# Patient Record
Sex: FEMALE | Race: BLACK OR AFRICAN AMERICAN | NOT HISPANIC OR LATINO | Employment: FULL TIME | URBAN - METROPOLITAN AREA
[De-identification: names, ages, dates, MRNs, and addresses within clinical notes are randomized per-mention and may not be internally consistent; named-entity substitution may affect disease eponyms.]

---

## 2017-05-25 ENCOUNTER — HOSPITAL ENCOUNTER (EMERGENCY)
Facility: HOSPITAL | Age: 32
Discharge: HOME | End: 2017-05-25
Attending: EMERGENCY MEDICINE | Admitting: EMERGENCY MEDICINE
Payer: COMMERCIAL

## 2017-05-25 VITALS
TEMPERATURE: 98.6 F | OXYGEN SATURATION: 100 % | DIASTOLIC BLOOD PRESSURE: 57 MMHG | HEIGHT: 65 IN | SYSTOLIC BLOOD PRESSURE: 113 MMHG | HEART RATE: 84 BPM | RESPIRATION RATE: 16 BRPM | WEIGHT: 107.58 LBS | BODY MASS INDEX: 17.92 KG/M2

## 2017-05-25 DIAGNOSIS — Z34.91 FIRST TRIMESTER PREGNANCY: Primary | ICD-10-CM

## 2017-05-25 LAB
BILIRUB UR QL STRIP: NEGATIVE
CLARITY UR: CLEAR
COLOR UR: YELLOW
GLUCOSE UR STRIP-MCNC: NEGATIVE MG/DL
HCG UR QL: POSITIVE
HGB UR QL STRIP.AUTO: NEGATIVE
KETONES UR STRIP-MCNC: NEGATIVE MG/DL
LEUKOCYTE ESTERASE UR QL STRIP: NEGATIVE
NITRITE UR QL STRIP: NEGATIVE
PH UR STRIP.AUTO: 7 [PH] (ref 4.5–8)
PROT UR STRIP-MCNC: NEGATIVE MG/DL
SP GR UR STRIP.AUTO: 1.02 (ref 1–1.03)
UROBILINOGEN UR QL STRIP.AUTO: 1 E.U./DL

## 2017-05-25 PROCEDURE — 81003 URINALYSIS AUTO W/O SCOPE: CPT | Performed by: EMERGENCY MEDICINE

## 2017-05-25 PROCEDURE — 81025 URINE PREGNANCY TEST: CPT | Performed by: EMERGENCY MEDICINE

## 2017-05-25 PROCEDURE — 99284 EMERGENCY DEPT VISIT MOD MDM: CPT

## 2017-05-25 RX ORDER — ACETAMINOPHEN 325 MG/1
650 TABLET ORAL ONCE
Status: COMPLETED | OUTPATIENT
Start: 2017-05-25 | End: 2017-05-25

## 2017-05-25 RX ORDER — ACETAMINOPHEN 325 MG/1
650 TABLET ORAL ONCE
Status: DISCONTINUED | OUTPATIENT
Start: 2017-05-25 | End: 2017-05-25

## 2017-05-25 RX ADMIN — ACETAMINOPHEN 650 MG: 325 TABLET ORAL at 14:16

## 2017-05-31 ENCOUNTER — APPOINTMENT (OUTPATIENT)
Dept: LAB | Facility: MEDICAL CENTER | Age: 32
End: 2017-05-31
Payer: COMMERCIAL

## 2017-05-31 ENCOUNTER — ALLSCRIPTS OFFICE VISIT (OUTPATIENT)
Dept: OTHER | Facility: OTHER | Age: 32
End: 2017-05-31

## 2017-05-31 DIAGNOSIS — Z33.1 PREGNANT STATE, INCIDENTAL: ICD-10-CM

## 2017-05-31 LAB
BACTERIA UR QL AUTO: NORMAL /HPF
BASOPHILS # BLD AUTO: 0.01 THOUSANDS/ΜL (ref 0–0.1)
BASOPHILS NFR BLD AUTO: 0 % (ref 0–1)
BILIRUB UR QL STRIP: NEGATIVE
CLARITY UR: CLEAR
COLOR UR: YELLOW
EOSINOPHIL # BLD AUTO: 0.22 THOUSAND/ΜL (ref 0–0.61)
EOSINOPHIL NFR BLD AUTO: 2 % (ref 0–6)
ERYTHROCYTE [DISTWIDTH] IN BLOOD BY AUTOMATED COUNT: 13.8 % (ref 11.6–15.1)
GLUCOSE UR STRIP-MCNC: NEGATIVE MG/DL
HCT VFR BLD AUTO: 35.3 % (ref 34.8–46.1)
HGB BLD-MCNC: 11.5 G/DL (ref 11.5–15.4)
HGB UR QL STRIP.AUTO: NEGATIVE
HYALINE CASTS #/AREA URNS LPF: NORMAL /LPF
KETONES UR STRIP-MCNC: NEGATIVE MG/DL
LEUKOCYTE ESTERASE UR QL STRIP: NEGATIVE
LYMPHOCYTES # BLD AUTO: 2.11 THOUSANDS/ΜL (ref 0.6–4.47)
LYMPHOCYTES NFR BLD AUTO: 20 % (ref 14–44)
MCH RBC QN AUTO: 27.8 PG (ref 26.8–34.3)
MCHC RBC AUTO-ENTMCNC: 32.6 G/DL (ref 31.4–37.4)
MCV RBC AUTO: 85 FL (ref 82–98)
MONOCYTES # BLD AUTO: 0.58 THOUSAND/ΜL (ref 0.17–1.22)
MONOCYTES NFR BLD AUTO: 6 % (ref 4–12)
NEUTROPHILS # BLD AUTO: 7.38 THOUSANDS/ΜL (ref 1.85–7.62)
NEUTS SEG NFR BLD AUTO: 72 % (ref 43–75)
NITRITE UR QL STRIP: NEGATIVE
NON-SQ EPI CELLS URNS QL MICRO: NORMAL /HPF
NRBC BLD AUTO-RTO: 0 /100 WBCS
PH UR STRIP.AUTO: 6.5 [PH] (ref 4.5–8)
PLATELET # BLD AUTO: 287 THOUSANDS/UL (ref 149–390)
PMV BLD AUTO: 10.3 FL (ref 8.9–12.7)
PROT UR STRIP-MCNC: NEGATIVE MG/DL
RBC # BLD AUTO: 4.14 MILLION/UL (ref 3.81–5.12)
RBC #/AREA URNS AUTO: NORMAL /HPF
RUBV IGG SERPL IA-ACNC: 8.3 IU/ML
SP GR UR STRIP.AUTO: 1.02 (ref 1–1.03)
UROBILINOGEN UR QL STRIP.AUTO: 0.2 E.U./DL
WBC # BLD AUTO: 10.32 THOUSAND/UL (ref 4.31–10.16)
WBC #/AREA URNS AUTO: NORMAL /HPF

## 2017-05-31 PROCEDURE — 81001 URINALYSIS AUTO W/SCOPE: CPT

## 2017-05-31 PROCEDURE — 87086 URINE CULTURE/COLONY COUNT: CPT

## 2017-05-31 PROCEDURE — 80081 OBSTETRIC PANEL INC HIV TSTG: CPT

## 2017-05-31 PROCEDURE — 36415 COLL VENOUS BLD VENIPUNCTURE: CPT

## 2017-06-01 ENCOUNTER — LAB REQUISITION (OUTPATIENT)
Dept: LAB | Facility: HOSPITAL | Age: 32
End: 2017-06-01
Payer: COMMERCIAL

## 2017-06-01 DIAGNOSIS — Z33.1 PREGNANT STATE, INCIDENTAL: ICD-10-CM

## 2017-06-01 LAB
ABO GROUP BLD: NORMAL
BACTERIA UR CULT: NORMAL
BLD GP AB SCN SERPL QL: NEGATIVE
HBV SURFACE AG SER QL: NORMAL
RH BLD: POSITIVE
RPR SER QL: NORMAL
SPECIMEN EXPIRATION DATE: NORMAL

## 2017-06-02 ENCOUNTER — GENERIC CONVERSION - ENCOUNTER (OUTPATIENT)
Dept: OTHER | Facility: OTHER | Age: 32
End: 2017-06-02

## 2017-06-02 LAB — HIV 1+2 AB+HIV1 P24 AG SERPL QL IA: NORMAL

## 2017-06-14 ENCOUNTER — ALLSCRIPTS OFFICE VISIT (OUTPATIENT)
Dept: OTHER | Facility: OTHER | Age: 32
End: 2017-06-14

## 2017-07-03 ENCOUNTER — APPOINTMENT (EMERGENCY)
Dept: ULTRASOUND IMAGING | Facility: HOSPITAL | Age: 32
End: 2017-07-03
Payer: COMMERCIAL

## 2017-07-03 ENCOUNTER — HOSPITAL ENCOUNTER (EMERGENCY)
Facility: HOSPITAL | Age: 32
Discharge: HOME | End: 2017-07-03
Attending: EMERGENCY MEDICINE | Admitting: EMERGENCY MEDICINE
Payer: COMMERCIAL

## 2017-07-03 ENCOUNTER — GENERIC CONVERSION - ENCOUNTER (OUTPATIENT)
Dept: OTHER | Facility: OTHER | Age: 32
End: 2017-07-03

## 2017-07-03 VITALS
OXYGEN SATURATION: 100 % | DIASTOLIC BLOOD PRESSURE: 54 MMHG | BODY MASS INDEX: 18.3 KG/M2 | TEMPERATURE: 98.6 F | SYSTOLIC BLOOD PRESSURE: 116 MMHG | RESPIRATION RATE: 18 BRPM | HEART RATE: 75 BPM | WEIGHT: 113.9 LBS | HEIGHT: 66 IN

## 2017-07-03 DIAGNOSIS — N39.0 URINARY TRACT INFECTION: ICD-10-CM

## 2017-07-03 DIAGNOSIS — O20.0 THREATENED MISCARRIAGE: Primary | ICD-10-CM

## 2017-07-03 LAB
ABO GROUP BLD: NORMAL
B-HCG SERPL-ACNC: ABNORMAL MIU/ML
BACTERIA UR QL AUTO: ABNORMAL /HPF
BASOPHILS # BLD AUTO: 0.01 THOUSANDS/ΜL (ref 0–0.1)
BASOPHILS NFR BLD AUTO: 0 % (ref 0–1)
BILIRUB UR QL STRIP: ABNORMAL
BLD GP AB SCN SERPL QL: NEGATIVE
CLARITY UR: ABNORMAL
COLOR UR: ABNORMAL
EOSINOPHIL # BLD AUTO: 0 THOUSAND/ΜL (ref 0–0.61)
EOSINOPHIL NFR BLD AUTO: 0 % (ref 0–6)
ERYTHROCYTE [DISTWIDTH] IN BLOOD BY AUTOMATED COUNT: 13.7 % (ref 11.6–15.1)
GLUCOSE UR STRIP-MCNC: NEGATIVE MG/DL
HCG UR QL: ABNORMAL
HCT VFR BLD AUTO: 34 % (ref 34.8–46.1)
HGB BLD-MCNC: 11.1 G/DL (ref 11.5–15.4)
HGB UR QL STRIP.AUTO: ABNORMAL
KETONES UR STRIP-MCNC: NEGATIVE MG/DL
LEUKOCYTE ESTERASE UR QL STRIP: NEGATIVE
LYMPHOCYTES # BLD AUTO: 1.83 THOUSANDS/ΜL (ref 0.6–4.47)
LYMPHOCYTES NFR BLD AUTO: 17 % (ref 14–44)
MCH RBC QN AUTO: 27.5 PG (ref 26.8–34.3)
MCHC RBC AUTO-ENTMCNC: 32.6 G/DL (ref 31.4–37.4)
MCV RBC AUTO: 84 FL (ref 82–98)
MONOCYTES # BLD AUTO: 0.56 THOUSAND/ΜL (ref 0.17–1.22)
MONOCYTES NFR BLD AUTO: 5 % (ref 4–12)
NEUTROPHILS # BLD AUTO: 8.16 THOUSANDS/ΜL (ref 1.85–7.62)
NEUTS SEG NFR BLD AUTO: 77 % (ref 43–75)
NITRITE UR QL STRIP: NEGATIVE
NON-SQ EPI CELLS URNS QL MICRO: ABNORMAL /HPF
NRBC BLD AUTO-RTO: 0 /100 WBCS
PH UR STRIP.AUTO: 5.5 [PH] (ref 4.5–8)
PLATELET # BLD AUTO: 196 THOUSANDS/UL (ref 149–390)
PMV BLD AUTO: 10.1 FL (ref 8.9–12.7)
PROT UR STRIP-MCNC: ABNORMAL MG/DL
RBC # BLD AUTO: 4.03 MILLION/UL (ref 3.81–5.12)
RBC #/AREA URNS AUTO: ABNORMAL /HPF
RH BLD: POSITIVE
SP GR UR STRIP.AUTO: 1.02 (ref 1–1.03)
SPECIMEN EXPIRATION DATE: NORMAL
UROBILINOGEN UR QL STRIP.AUTO: 0.2 E.U./DL
WBC # BLD AUTO: 10.6 THOUSAND/UL (ref 4.31–10.16)
WBC #/AREA URNS AUTO: ABNORMAL /HPF

## 2017-07-03 PROCEDURE — 81025 URINE PREGNANCY TEST: CPT

## 2017-07-03 PROCEDURE — 85025 COMPLETE CBC W/AUTO DIFF WBC: CPT | Performed by: EMERGENCY MEDICINE

## 2017-07-03 PROCEDURE — 86850 RBC ANTIBODY SCREEN: CPT | Performed by: EMERGENCY MEDICINE

## 2017-07-03 PROCEDURE — 86900 BLOOD TYPING SEROLOGIC ABO: CPT | Performed by: EMERGENCY MEDICINE

## 2017-07-03 PROCEDURE — 36415 COLL VENOUS BLD VENIPUNCTURE: CPT | Performed by: EMERGENCY MEDICINE

## 2017-07-03 PROCEDURE — 99284 EMERGENCY DEPT VISIT MOD MDM: CPT

## 2017-07-03 PROCEDURE — 84702 CHORIONIC GONADOTROPIN TEST: CPT | Performed by: EMERGENCY MEDICINE

## 2017-07-03 PROCEDURE — 81001 URINALYSIS AUTO W/SCOPE: CPT | Performed by: EMERGENCY MEDICINE

## 2017-07-03 PROCEDURE — 76801 OB US < 14 WKS SINGLE FETUS: CPT

## 2017-07-03 PROCEDURE — 86901 BLOOD TYPING SEROLOGIC RH(D): CPT | Performed by: EMERGENCY MEDICINE

## 2017-07-03 RX ORDER — NITROFURANTOIN 25; 75 MG/1; MG/1
100 CAPSULE ORAL 2 TIMES DAILY
Qty: 14 CAPSULE | Refills: 0 | Status: SHIPPED | OUTPATIENT
Start: 2017-07-03 | End: 2017-07-10

## 2017-07-05 ENCOUNTER — ALLSCRIPTS OFFICE VISIT (OUTPATIENT)
Dept: OTHER | Facility: OTHER | Age: 32
End: 2017-07-05

## 2017-07-05 ENCOUNTER — APPOINTMENT (OUTPATIENT)
Dept: LAB | Facility: HOSPITAL | Age: 32
End: 2017-07-05
Attending: OBSTETRICS & GYNECOLOGY
Payer: COMMERCIAL

## 2017-07-05 DIAGNOSIS — N63.0 BREAST LUMP: ICD-10-CM

## 2017-07-05 DIAGNOSIS — Z34.00 ENCOUNTER FOR SUPERVISION OF NORMAL FIRST PREGNANCY: ICD-10-CM

## 2017-07-05 PROCEDURE — 87660 TRICHOMONAS VAGIN DIR PROBE: CPT

## 2017-07-05 PROCEDURE — G0145 SCR C/V CYTO,THINLAYER,RESCR: HCPCS | Performed by: OBSTETRICS & GYNECOLOGY

## 2017-07-05 PROCEDURE — 87510 GARDNER VAG DNA DIR PROBE: CPT

## 2017-07-05 PROCEDURE — 87624 HPV HI-RISK TYP POOLED RSLT: CPT | Performed by: OBSTETRICS & GYNECOLOGY

## 2017-07-05 PROCEDURE — 87491 CHLMYD TRACH DNA AMP PROBE: CPT | Performed by: OBSTETRICS & GYNECOLOGY

## 2017-07-05 PROCEDURE — 87591 N.GONORRHOEAE DNA AMP PROB: CPT | Performed by: OBSTETRICS & GYNECOLOGY

## 2017-07-05 PROCEDURE — 87480 CANDIDA DNA DIR PROBE: CPT

## 2017-07-06 ENCOUNTER — LAB REQUISITION (OUTPATIENT)
Dept: LAB | Facility: HOSPITAL | Age: 32
End: 2017-07-06
Payer: COMMERCIAL

## 2017-07-06 DIAGNOSIS — Z34.00 ENCOUNTER FOR SUPERVISION OF NORMAL FIRST PREGNANCY: ICD-10-CM

## 2017-07-07 LAB
CANDIDA RRNA VAG QL PROBE: NEGATIVE
G VAGINALIS RRNA GENITAL QL PROBE: POSITIVE
T VAGINALIS RRNA GENITAL QL PROBE: NEGATIVE

## 2017-07-10 LAB
CHLAMYDIA DNA CVX QL NAA+PROBE: NORMAL
N GONORRHOEA DNA GENITAL QL NAA+PROBE: NORMAL

## 2017-07-13 LAB — HPV RRNA GENITAL QL NAA+PROBE: NORMAL

## 2017-07-17 LAB
LAB AP GYN PRIMARY INTERPRETATION: NORMAL
LAB AP LMP: NORMAL
Lab: NORMAL
PATH INTERP SPEC-IMP: NORMAL

## 2017-07-19 ENCOUNTER — APPOINTMENT (OUTPATIENT)
Dept: LAB | Facility: MEDICAL CENTER | Age: 32
End: 2017-07-19
Payer: COMMERCIAL

## 2017-07-19 ENCOUNTER — GENERIC CONVERSION - ENCOUNTER (OUTPATIENT)
Dept: OTHER | Facility: OTHER | Age: 32
End: 2017-07-19

## 2017-07-19 ENCOUNTER — TRANSCRIBE ORDERS (OUTPATIENT)
Dept: ADMINISTRATIVE | Facility: HOSPITAL | Age: 32
End: 2017-07-19

## 2017-07-19 DIAGNOSIS — Z33.1 PREGNANT STATE, INCIDENTAL: Primary | ICD-10-CM

## 2017-07-19 LAB
BACTERIA UR QL AUTO: ABNORMAL /HPF
BASOPHILS # BLD AUTO: 0.01 THOUSANDS/ΜL (ref 0–0.1)
BASOPHILS NFR BLD AUTO: 0 % (ref 0–1)
BILIRUB UR QL STRIP: NEGATIVE
CLARITY UR: ABNORMAL
COLOR UR: YELLOW
EOSINOPHIL # BLD AUTO: 0.13 THOUSAND/ΜL (ref 0–0.61)
EOSINOPHIL NFR BLD AUTO: 1 % (ref 0–6)
ERYTHROCYTE [DISTWIDTH] IN BLOOD BY AUTOMATED COUNT: 14.2 % (ref 11.6–15.1)
GLUCOSE UR STRIP-MCNC: NEGATIVE MG/DL
HCT VFR BLD AUTO: 32.5 % (ref 34.8–46.1)
HGB BLD-MCNC: 10.5 G/DL (ref 11.5–15.4)
HGB UR QL STRIP.AUTO: ABNORMAL
KETONES UR STRIP-MCNC: NEGATIVE MG/DL
LEUKOCYTE ESTERASE UR QL STRIP: ABNORMAL
LYMPHOCYTES # BLD AUTO: 1.57 THOUSANDS/ΜL (ref 0.6–4.47)
LYMPHOCYTES NFR BLD AUTO: 17 % (ref 14–44)
MCH RBC QN AUTO: 27.6 PG (ref 26.8–34.3)
MCHC RBC AUTO-ENTMCNC: 32.3 G/DL (ref 31.4–37.4)
MCV RBC AUTO: 85 FL (ref 82–98)
MONOCYTES # BLD AUTO: 0.73 THOUSAND/ΜL (ref 0.17–1.22)
MONOCYTES NFR BLD AUTO: 8 % (ref 4–12)
NEUTROPHILS # BLD AUTO: 7.02 THOUSANDS/ΜL (ref 1.85–7.62)
NEUTS SEG NFR BLD AUTO: 74 % (ref 43–75)
NITRITE UR QL STRIP: NEGATIVE
NON-SQ EPI CELLS URNS QL MICRO: ABNORMAL /HPF
NRBC BLD AUTO-RTO: 0 /100 WBCS
PH UR STRIP.AUTO: 6 [PH] (ref 4.5–8)
PLATELET # BLD AUTO: 210 THOUSANDS/UL (ref 149–390)
PMV BLD AUTO: 10.1 FL (ref 8.9–12.7)
PROT UR STRIP-MCNC: NEGATIVE MG/DL
RBC # BLD AUTO: 3.81 MILLION/UL (ref 3.81–5.12)
RBC #/AREA URNS AUTO: ABNORMAL /HPF
SP GR UR STRIP.AUTO: 1.02 (ref 1–1.03)
UROBILINOGEN UR QL STRIP.AUTO: 0.2 E.U./DL
WBC # BLD AUTO: 9.48 THOUSAND/UL (ref 4.31–10.16)
WBC #/AREA URNS AUTO: ABNORMAL /HPF

## 2017-07-19 PROCEDURE — 85025 COMPLETE CBC W/AUTO DIFF WBC: CPT | Performed by: OBSTETRICS & GYNECOLOGY

## 2017-07-19 PROCEDURE — 81001 URINALYSIS AUTO W/SCOPE: CPT | Performed by: OBSTETRICS & GYNECOLOGY

## 2017-07-19 PROCEDURE — 36415 COLL VENOUS BLD VENIPUNCTURE: CPT | Performed by: OBSTETRICS & GYNECOLOGY

## 2017-07-20 ENCOUNTER — GENERIC CONVERSION - ENCOUNTER (OUTPATIENT)
Dept: OTHER | Facility: OTHER | Age: 32
End: 2017-07-20

## 2017-07-20 ENCOUNTER — ALLSCRIPTS OFFICE VISIT (OUTPATIENT)
Dept: PERINATAL CARE | Facility: CLINIC | Age: 32
End: 2017-07-20
Payer: COMMERCIAL

## 2017-07-20 PROCEDURE — 76805 OB US >/= 14 WKS SNGL FETUS: CPT | Performed by: OBSTETRICS & GYNECOLOGY

## 2017-07-24 ENCOUNTER — GENERIC CONVERSION - ENCOUNTER (OUTPATIENT)
Dept: OTHER | Facility: OTHER | Age: 32
End: 2017-07-24

## 2017-07-26 ENCOUNTER — ALLSCRIPTS OFFICE VISIT (OUTPATIENT)
Dept: OTHER | Facility: OTHER | Age: 32
End: 2017-07-26

## 2017-08-23 ENCOUNTER — GENERIC CONVERSION - ENCOUNTER (OUTPATIENT)
Dept: OTHER | Facility: OTHER | Age: 32
End: 2017-08-23

## 2017-08-25 ENCOUNTER — GENERIC CONVERSION - ENCOUNTER (OUTPATIENT)
Dept: OTHER | Facility: OTHER | Age: 32
End: 2017-08-25

## 2017-08-25 ENCOUNTER — ALLSCRIPTS OFFICE VISIT (OUTPATIENT)
Dept: PERINATAL CARE | Facility: MEDICAL CENTER | Age: 32
End: 2017-08-25
Payer: COMMERCIAL

## 2017-08-25 PROCEDURE — 76805 OB US >/= 14 WKS SNGL FETUS: CPT | Performed by: OBSTETRICS & GYNECOLOGY

## 2017-08-25 PROCEDURE — 76817 TRANSVAGINAL US OBSTETRIC: CPT | Performed by: OBSTETRICS & GYNECOLOGY

## 2017-09-11 ENCOUNTER — GENERIC CONVERSION - ENCOUNTER (OUTPATIENT)
Dept: OTHER | Facility: OTHER | Age: 32
End: 2017-09-11

## 2017-09-12 ENCOUNTER — GENERIC CONVERSION - ENCOUNTER (OUTPATIENT)
Dept: OTHER | Facility: OTHER | Age: 32
End: 2017-09-12

## 2017-09-12 DIAGNOSIS — Z34.90 ENCOUNTER FOR SUPERVISION OF NORMAL PREGNANCY: ICD-10-CM

## 2017-10-10 ENCOUNTER — APPOINTMENT (OUTPATIENT)
Dept: LAB | Facility: MEDICAL CENTER | Age: 32
End: 2017-10-10
Payer: COMMERCIAL

## 2017-10-10 DIAGNOSIS — Z34.90 ENCOUNTER FOR SUPERVISION OF NORMAL PREGNANCY: ICD-10-CM

## 2017-10-10 LAB
BASOPHILS # BLD AUTO: 0.01 THOUSANDS/ΜL (ref 0–0.1)
BASOPHILS NFR BLD AUTO: 0 % (ref 0–1)
EOSINOPHIL # BLD AUTO: 0.12 THOUSAND/ΜL (ref 0–0.61)
EOSINOPHIL NFR BLD AUTO: 1 % (ref 0–6)
ERYTHROCYTE [DISTWIDTH] IN BLOOD BY AUTOMATED COUNT: 13.6 % (ref 11.6–15.1)
GLUCOSE 1H P 50 G GLC PO SERPL-MCNC: 129 MG/DL
HCT VFR BLD AUTO: 32.1 % (ref 34.8–46.1)
HGB BLD-MCNC: 10.5 G/DL (ref 11.5–15.4)
LYMPHOCYTES # BLD AUTO: 1.19 THOUSANDS/ΜL (ref 0.6–4.47)
LYMPHOCYTES NFR BLD AUTO: 12 % (ref 14–44)
MCH RBC QN AUTO: 27.7 PG (ref 26.8–34.3)
MCHC RBC AUTO-ENTMCNC: 32.7 G/DL (ref 31.4–37.4)
MCV RBC AUTO: 85 FL (ref 82–98)
MONOCYTES # BLD AUTO: 0.46 THOUSAND/ΜL (ref 0.17–1.22)
MONOCYTES NFR BLD AUTO: 5 % (ref 4–12)
NEUTROPHILS # BLD AUTO: 8.05 THOUSANDS/ΜL (ref 1.85–7.62)
NEUTS SEG NFR BLD AUTO: 82 % (ref 43–75)
NRBC BLD AUTO-RTO: 0 /100 WBCS
PLATELET # BLD AUTO: 200 THOUSANDS/UL (ref 149–390)
PMV BLD AUTO: 10.7 FL (ref 8.9–12.7)
RBC # BLD AUTO: 3.79 MILLION/UL (ref 3.81–5.12)
WBC # BLD AUTO: 9.87 THOUSAND/UL (ref 4.31–10.16)

## 2017-10-10 PROCEDURE — 82950 GLUCOSE TEST: CPT

## 2017-10-10 PROCEDURE — 85025 COMPLETE CBC W/AUTO DIFF WBC: CPT

## 2017-10-10 PROCEDURE — 86592 SYPHILIS TEST NON-TREP QUAL: CPT

## 2017-10-10 PROCEDURE — 36415 COLL VENOUS BLD VENIPUNCTURE: CPT

## 2017-10-11 ENCOUNTER — GENERIC CONVERSION - ENCOUNTER (OUTPATIENT)
Dept: OTHER | Facility: OTHER | Age: 32
End: 2017-10-11

## 2017-10-11 LAB — RPR SER QL: NORMAL

## 2017-10-20 ENCOUNTER — GENERIC CONVERSION - ENCOUNTER (OUTPATIENT)
Dept: OTHER | Facility: OTHER | Age: 32
End: 2017-10-20

## 2017-10-20 ENCOUNTER — APPOINTMENT (OUTPATIENT)
Dept: PERINATAL CARE | Facility: MEDICAL CENTER | Age: 32
End: 2017-10-20
Payer: COMMERCIAL

## 2017-10-20 PROCEDURE — 76816 OB US FOLLOW-UP PER FETUS: CPT | Performed by: OBSTETRICS & GYNECOLOGY

## 2017-10-26 ENCOUNTER — GENERIC CONVERSION - ENCOUNTER (OUTPATIENT)
Dept: OTHER | Facility: OTHER | Age: 32
End: 2017-10-26

## 2017-11-09 ENCOUNTER — ALLSCRIPTS OFFICE VISIT (OUTPATIENT)
Dept: OTHER | Facility: OTHER | Age: 32
End: 2017-11-09

## 2017-11-20 ENCOUNTER — ALLSCRIPTS OFFICE VISIT (OUTPATIENT)
Dept: OTHER | Facility: OTHER | Age: 32
End: 2017-11-20

## 2017-11-30 ENCOUNTER — GENERIC CONVERSION - ENCOUNTER (OUTPATIENT)
Dept: OTHER | Facility: OTHER | Age: 32
End: 2017-11-30

## 2017-12-13 ENCOUNTER — GENERIC CONVERSION - ENCOUNTER (OUTPATIENT)
Dept: OTHER | Facility: OTHER | Age: 32
End: 2017-12-13

## 2017-12-13 ENCOUNTER — LAB REQUISITION (OUTPATIENT)
Dept: LAB | Facility: HOSPITAL | Age: 32
End: 2017-12-13
Payer: COMMERCIAL

## 2017-12-13 DIAGNOSIS — Z34.00 ENCOUNTER FOR SUPERVISION OF NORMAL FIRST PREGNANCY: ICD-10-CM

## 2017-12-13 PROCEDURE — 87653 STREP B DNA AMP PROBE: CPT | Performed by: OBSTETRICS & GYNECOLOGY

## 2017-12-16 LAB — GP B STREP DNA SPEC QL NAA+PROBE: NORMAL

## 2017-12-21 ENCOUNTER — GENERIC CONVERSION - ENCOUNTER (OUTPATIENT)
Dept: OTHER | Facility: OTHER | Age: 32
End: 2017-12-21

## 2017-12-28 ENCOUNTER — GENERIC CONVERSION - ENCOUNTER (OUTPATIENT)
Dept: OTHER | Facility: OTHER | Age: 32
End: 2017-12-28

## 2018-01-04 ENCOUNTER — GENERIC CONVERSION - ENCOUNTER (OUTPATIENT)
Dept: OTHER | Facility: OTHER | Age: 33
End: 2018-01-04

## 2018-01-09 NOTE — MISCELLANEOUS
Message   Recorded as Task   Date: 09/11/2017 11:59 AM, Created By: Yodit Batista   Task Name: Medical Complaint Callback   Assigned To: Dakota Ramos   Regarding Patient: Dany Lamar, Status: In Progress   Comment:    Rosita Roca - 11 Sep 2017 11:59 AM     TASK CREATED  Caller: Self; Medical Complaint; [phone] (Home)  Pt is 22w and started with a rash on her arms last Monday (09/04)   and it then spread to her torso and neck  Pt states it is itchy and bumpy  Casandra,Raúl - 11 Sep 2017 1:17 PM     TASK IN PROGRESS   Casandra,Raúl - 11 Sep 2017 1:41 PM     TASK EDITED  called pt- pt states "has a rash over her whole body now " only itches at night-time, but is concerned because now has rash on her labia as well "  apt given for [facility] office tomorrow at [address] [facility]  Active Problems    1  Bacterial vaginosis (616 10,041 9) (N76 0,B96 89)   2  Left breast lump (611 72) (N63)   3  Pregnancy (V22 2) (Z34 90)   4  Pregnancy, first, obstetrical care (V22 0) (Z34 00)   5  Second trimester bleeding (641 93) (O46 92)    Current Meds   1  Prenatal Vitamins TABS; Therapy: (Recorded:98Yvl1246) to Recorded   2  Tylenol 325 MG Oral Tablet; TAKE TABLET  PRN; Therapy: (Recorded:15Ovc5521) to Recorded    Allergies    1  Penicillins    2  Peanuts   3   Seasonal    Signatures   Electronically signed by : Néstor Cutler RN; Sep 11 2017  1:41PM EST                       (Author)

## 2018-01-11 ENCOUNTER — GENERIC CONVERSION - ENCOUNTER (OUTPATIENT)
Dept: OTHER | Facility: OTHER | Age: 33
End: 2018-01-11

## 2018-01-11 NOTE — MISCELLANEOUS
Message   Recorded as Task   Date: 06/01/2017 04:34 PM, Created By: June Mesa   Task Name: Go to Result   Assigned To: Kaitlin Recinos   Regarding Patient: Christiano Delgado, Status: In Progress   Comment:    Chase Rausch - 01 Jun 2017 4:34 PM     TASK CREATED  Rubella equivocal   Kaitlin Recinos - 02 Jun 2017 8:05 AM     TASK IN PROGRESS   Kaitlin Recinos - 02 Jun 2017 8:05 AM     TASK REPLIED TO: Previously Assigned To [facility],Team  Attempted to reach pt  VM box full  Could not LM  Laxmi Diaz - 02 Jun 2017 9:53 AM     TASK REPLIED TO: Previously Assigned To [address] [place] [address]  I TOLD HER SHE IS RUBELLA EQUIVOCAL  Active Problems    1   Pregnancy (V22 2) (Z33 1)    Signatures   Electronically signed by : Sissy Aguirre, ; Jun 2 2017  9:59AM EST                       (Author)

## 2018-01-11 NOTE — MISCELLANEOUS
Message   Recorded as Task   Date: 10/11/2017 07:12 AM, Created By: Sosa George   Task Name: Go to Result   Assigned To: Bob Camacho   Regarding Patient: Phil Saunders, Status: In Progress   Comment:    Bennie Caoy - 11 Oct 2017 7:12 AM     TASK CREATED  1hr GTT is normal  CBC shows anemia - please add iron one po daily  Thanks! CasandraArúl - 11 Oct 2017 8:29 AM     TASK IN PROGRESS   CasandraRaúl - 11 Oct 2017 8:32 AM     TASK EDITED  attempted to call pt-voicemessage stated  "mailbox is full, unable to leave message " will attempt to call again today  Rosetta April - 11 Oct 2017 8:51 AM     TASK EDITED  Pt will take ferrous sulfate - 1 od  Will see CG today and discuss        Active Problems    1  Bacterial vaginosis (616 10,041 9) (N76 0,B96 89)   2  Left breast lump (611 72) (N63 20)   3  Pregnancy (V22 2) (Z34 90)   4  Pregnancy, first, obstetrical care (V22 0) (Z34 00)   5  Pregnancy, obstetrical care (V22 1) (Z34 90)   6  Second trimester bleeding (641 93) (O46 92)    Current Meds   1  Prenatal Vitamins TABS; Therapy: (Recorded:59Izh6180) to Recorded   2  Tylenol 325 MG Oral Tablet; TAKE TABLET  PRN; Therapy: (Recorded:30Qtk0570) to Recorded    Allergies    1  Penicillins    2  Peanuts   3  Seasonal    Signatures   Electronically signed by :  Francesco Morrissey, ; Oct 11 2017  8:52AM EST                       (Author)

## 2018-01-12 VITALS
WEIGHT: 108.8 LBS | BODY MASS INDEX: 20.02 KG/M2 | SYSTOLIC BLOOD PRESSURE: 105 MMHG | HEIGHT: 62 IN | DIASTOLIC BLOOD PRESSURE: 64 MMHG

## 2018-01-13 VITALS
WEIGHT: 115 LBS | HEIGHT: 62 IN | SYSTOLIC BLOOD PRESSURE: 104 MMHG | BODY MASS INDEX: 21.16 KG/M2 | DIASTOLIC BLOOD PRESSURE: 63 MMHG

## 2018-01-13 VITALS
HEIGHT: 62 IN | DIASTOLIC BLOOD PRESSURE: 60 MMHG | BODY MASS INDEX: 19.51 KG/M2 | SYSTOLIC BLOOD PRESSURE: 110 MMHG | WEIGHT: 106 LBS

## 2018-01-13 NOTE — MISCELLANEOUS
Message   Recorded as Task   Date: 11/20/2017 09:59 AM, Created By: Adelita Ansari   Task Name: Medical Complaint Callback   Assigned To: Ramirez Malone   Regarding Patient: Michelet Andrews, Status: Active   Comment:    Adelita Ansari - 20 Nov 2017 9:59 AM     TASK CREATED  Caller: Self; Medical Complaint; [phone] (Home)  Incoming prenatal call  Patient is 32 wks gestation  Reports menstrual like cramping and back pain which started this morning  No vaginal bleeding, leakage of fluid or contractions  Does not believe she is dehydrated, drinking plenty of fluids  Reports frequent voiding without associated pain or burning  States was told at last office visit 11/9 to take over the counter medications for symptom relief  Spoke with provider, advised scheduled appt today  Appt scheduled  Ramirez Malone - 20 Nov 2017 10:41 AM     TASK REPLIED TO: Previously Assigned To [address] [place] [address] with plan   Ramirez Malone - 20 Nov 2017 10:41 AM     TASK COMPLETED   Eden Butron - 20 Nov 2017 3:11 PM     TASK EDITED   Adelita Ansari - 20 Nov 2017 3:15 PM     TASK REACTIVATED   Eden Burton - 20 Nov 2017 3:19 PM     TASK EDITED  Patient no showed for todays appt  Call to patient  Patient states that she started to walk around and increase fluids and was feeling better  Advised to continue with hydration  Take Tylenol if needed  Advised to call back for an appt if symptoms return  Active Problems    1  Bacterial vaginosis (616 10,041 9) (N76 0,B96 89)   2  Left breast lump (611 72) (N63 20)   3  Need for Tdap vaccination (V06 1) (Z23)   4  Pregnancy (V22 2) (Z34 90)   5  Pregnancy, first, obstetrical care (V22 0) (Z34 00)   6  Pregnancy, obstetrical care (V22 1) (Z34 90)   7  Rubella non-immune status, antepartum (646 83,V15 83) (O99 89,Z28 3)   8  Second trimester bleeding (641 93) (O46 92)    Current Meds   1  Prenatal Vitamins TABS; Therapy: (Recorded:49Yqt2226) to Recorded   2   Tylenol 325 MG Oral Tablet; TAKE TABLET  PRN; Therapy: (Recorded:06Ysq1697) to Recorded    Allergies    1  Penicillins    2  Peanuts   3   Seasonal    Signatures   Electronically signed by : Roberto Rodriguez, ; Nov 20 2017  3:19PM EST                       (Author)

## 2018-01-13 NOTE — PROGRESS NOTES
OCT 20 2017         RE: Kwadwo Ledesma                                  To: [facility]  MR#: [id number]                                    [address]   : [address]                                   [address]   ENC: [id number]:CRISTINA Sheets, [address]   (Exam #: [id number])                           Fax: [phone]      The LMP of this 33year old,  G2, P1-0-0-1 patient was 2017, giving   her an KWESI of 2018 and a current gestational age of 35 weeks 0 days   by dates  A sonographic examination was performed on OCT 20 2017 using   real time equipment  The ultrasound examination was performed using   abdominal technique  The patient has a BMI of 20 8  Her blood pressure   today was 103/64  Earliest ultrasound found in her record: 2017   7w5d   2018 KWESI      Cardiac motion was observed at 139 bpm       INDICATIONS      previous    Evaluate missed anatomy      Exam Types      Level I      RESULTS      Fetus # 1 of 1   Vertex presentation   Fetal growth appeared normal   Placenta Location = Anterior   No placenta previa   Placenta Grade = II      MEASUREMENTS (* Included In Average GA)      AC              23 7 cm        28 weeks 0 days* (45%)   BPD              7 0 cm        28 weeks 1 day * (45%)   HC              26 1 cm        28 weeks 1 day * (39%)   Femur            5 5 cm        29 weeks 1 day * (58%)      Cerebellum       3 4 cm        30 weeks 1 day      HC/AC           1 10   FL/AC           0 23   FL/BPD          0 78   EFW (Ac/Fl/Hc)  1225 grams - 2 lbs 11 oz                 (53%)      THE AVERAGE GESTATIONAL AGE is 28 weeks 3 days +/- 14 days  AMNIOTIC FLUID      Q1: 7 0      Q2: 5 2      Q3: 5 2      Q4: 5 9   ABIGAIL Total = 23 3 cm   Amniotic Fluid: Normal      ANATOMY COMMENTS      The prior fetal anatomic survey was limited the area of the spine    These   anatomic views were seen today as sonographically normal within the   inherent limitations of fetal ultrasound and the anatomic survey is now   complete  Follow up intracranial anatomy (calvarium, cavum, lateral   ventricles, and cerebellum), cardiac anatomy (4chamber, LVOT, RVOT, and   3VV), diaphragm, stomach, kidneys, and bladder appear normal       IMPRESSION      Clark IUP   28 weeks and 3 days by this ultrasound  (KWESI=JAN 9 2018)   Vertex presentation   Fetal growth appeared normal   Regular fetal heart rate of 139 bpm   Anterior placenta   No placenta previa      GENERAL COMMENT      On exam today the patient appears well, in no acute distress, and denies   any complaints  Her abdomen is non-tender  The fetal anatomic survey is now complete  There is no sonographic   evidence of fetal abnormalities at this time  The remainder of the survey   was completed previously  There has been appropriate interval fetal   growth  Good fetal movement and tone are seen  The amniotic fluid volume   appears normal   The placenta is anterior and it appears sonographically   normal   The patient was informed of today's findings and all of her   questions were answered  The limitations of ultrasound were reviewed with   the patient, which she accepts  Precautions and fetal kick counts were   reviewed  Recommend the patient return as clinically indicated  Thank you very much for allowing us to participate in the care of this   very nice patient  Should you have any questions, please do not hesitate   to contact our office  LEXY Nj M D     Electronically signed 10/20/17 16:08

## 2018-01-14 VITALS
SYSTOLIC BLOOD PRESSURE: 100 MMHG | WEIGHT: 109 LBS | DIASTOLIC BLOOD PRESSURE: 62 MMHG | BODY MASS INDEX: 20.06 KG/M2 | HEIGHT: 62 IN

## 2018-01-15 NOTE — PROGRESS NOTES
AUG 25 2017         RE: Huan Overcast                                  To: [facility]  MR#: [id number]                                    [address]   : [address]                                   [address]   ENC: [id number]:BLANCO Sheets, [address]   (Exam #: [id number])                           Fax: [phone]      The LMP of this 33year old,  G2, P1-0-0-1 patient was 2017, giving   her an KWESI of 2018 and a current gestational age of 25 weeks 0 days   by dates  A sonographic examination was performed on AUG 25 2017 using   real time equipment  The ultrasound examination was performed using   abdominal & vaginal techniques  The patient has a BMI of 21 0  Her blood   pressure today was 104/63  Earliest ultrasound found in her record: 2017   7w5d   2018 KWESI      Cardiac motion was observed  INDICATIONS      second trimester bleeding   previous    fetal anatomical survey      Exam Types      LEVEL II   Transvaginal      RESULTS      Fetus # 1 of 1   Vertex presentation   Fetal growth appeared normal   Placenta Location = Anterior   No placenta previa   Placenta Grade = I      MEASUREMENTS (* Included In Average GA)      AC              15 1 cm        20 weeks 0 days* (52%)   BPD              5 1 cm        21 weeks 4 days* (91%)   HC              17 1 cm        19 weeks 4 days* (36%)   Femur            3 3 cm        20 weeks 4 days* (52%)      Humerus          3 3 cm        21 weeks 1 day   (79%)      Cerebellum       2 0 cm        20 weeks 0 days   Biorbit          3 2 cm        20 weeks 6 days   CisternaMagna    3 5 mm      HC/AC           1 13   FL/AC           0 22   FL/BPD          0 65   Ceph Index      0 90   EFW (Ac/Fl/Hc)   340 grams - 0 lbs 12 oz      THE AVERAGE GESTATIONAL AGE is 20 weeks 4 days +/- 10 days        AMNIOTIC FLUID      Amniotic Fluid: Normal      CERVICAL EVALUATION      SUPINE Cervical Length: 3 40 cm      OTHER TEST RESULTS           Funneling?: No             Dynamic Changes?: No        Resp  To TFP?: No      ANATOMY      Head                                    See Details   Face/Neck                               Normal   Th  Cav  Normal   Heart                                   Normal   Abd  Cav  Normal   Stomach                                 Normal   Right Kidney                            Normal   Left Kidney                             Normal   Bladder                                 Normal   Abd  Wall                               Normal   Spine                                   Not Visualized   Extrems                                 Normal   Genitalia                               Normal   Placenta                                Normal   Umbl  Cord                              Normal   Uterus                                  Normal   PCI                                     Normal      ANATOMY DETAILS      Visualized Appearing Sonographically Normal:   HEAD: (Calvarium, Cavum, Lateral Ventricles, Choroid Plexus, Cerebellum,   Cisterna Magna);    FACE/NECK: (Neck, Nuchal Fold, Profile, Orbits,   Nose/Lips, Palate, Face);    TH  CAV  : (Lungs, Diaphragm); HEART: (Four   Chamber View, Proximal Left Outflow, Proximal Right Outflow, 3VV, 3 Vessel   Trachea, Short Axis of Greater Vessels, Ductal Arch, Aortic Arch,   Interventricular Septum, Interatrial Septum, IVC, SVC, Cardiac Axis,   Cardiac Position);    ABD  CAV : (Liver);    STOMACH, RIGHT KIDNEY, LEFT   KIDNEY, BLADDER, ABD  WALL, EXTREMS: (Lt Humerus, Rt Humerus, Lt Forearm,   Rt Forearm, Lt Hand, Rt Hand, Lt Femur, Rt Femur, Lt Low Leg, Rt Low Leg,   Lt Foot, Rt Foot);    GENITALIA, PLACENTA, UMBL   CORD, UTERUS, PCI      Suboptimally Visualized:   HEAD: (BPD Level)      Not Visualized:   SPINE: (Cervical Spine, Thoracic Spine, Lumbar Spine, Sacrum)      ADNEXA The left ovary was not visualized  The right ovary appeared normal and   measured 3 2 x 2 3 x 1 7 cm with a volume of 6 5 cc  IMPRESSION      Clark IUP   20 weeks and 4 days by this ultrasound  (KWESI=JAN 8 2018)   Vertex presentation   Fetal growth appeared normal   Regular fetal heart rate   Anterior placenta   No placenta previa      GENERAL COMMENT      On exam today the patient appears well, in no acute distress, and denies   any complaints other than occasional intermittent vaginal bleeding  Her   abdomen is non-tender  Today's scan is limited by fetal position; therefore, the fetal anatomic   survey could not be completed  Good fetal movement and tone are seen  The amniotic fluid volume appears normal   The placenta is anterior and it   appears sonographically normal   A transvaginal ultrasound was performed   to assess the cervix, which was not seen well transabdominally  The   cervical length was 3 4 centimeters, which is normal for the current   gestational age  There was no significant funneling or dynamic changes   appreciated  The limitations of ultrasound were reviewed with the patient,   which she appears to understand and accepts  She was informed of today's   findings and all of her questions were answered  Recommend patient return in 8 weeks to our Center in order to complete the   fetal anatomic survey and for a fetal growth evaluation for the indication   of second trimester vaginal bleeding  Precautions were reviewed  Please note, in addition to the time spent discussing the results of the   ultrasound, I spent approximately 10 minutes of face-to-face time with the   patient, greater than 50% of which was spent in counseling and the   coordination of care for this patient  Thank you very much for allowing us to participate in the care of this   very nice patient  Should you have any questions, please do not hesitate   to contact our office  DEYANIRA Castano  Electronically signed 08/25/17 13:30           Electronically signed by:Chase FELIZ    Aug 28 2017  3:46PM EST

## 2018-01-16 NOTE — PROGRESS NOTES
2017         RE: Yuki Ferreria                                  To: [facility]  MR#: [id number]                                    [address]   : [address]                                   [address]   ENC: [id number]                             Sudeep, [address]   (Exam #: [id number])                           Fax: [phone]      The LMP of this 33year old,  G2, P1-0-0-1 patient was 2017, giving   her an KWESI of 2018 and a current gestational age of 12 weeks 6 days   by dates  A sonographic examination was performed on 2017 using   real time equipment  The ultrasound examination was performed using   abdominal technique  The patient has a BMI of 19 8  Her blood pressure   today was 105/61  Earliest ultrasound found in her record: 2017   7w5d   2018 KWESI      Thank you very much for your kind referral of the Yuki Ferreira to the   [facility]  in [place] on 2017 for fetal ultrasound   evaluation and MFM assessment  Cindi Hare is a 69-year-old  2 para   1001 patient who is currently at 14-6/7 weeks gestation by an estimated   due date of 2018 which is based upon menstrual dating  She   presents for the indication of vaginal bleeding  She has had intermittent   vaginal bleeding with lower abdominal cramping for just over 2 weeks  Otherwise, her prenatal course has been unremarkable and she has no other   complaint  Her past OB history is significant for a prior  section   at term in  following an uncomplicated prenatal course  She delivered   an appropriately grown baby, currently healthy  Her past medical history   is significant for mild, intermittent asthma treated with a rescue inhaler   as needed  Her past surgical history is significant for removal of a   benign breast lump  She takes no medication with the exception of a   prenatal vitamin on a daily basis   She has an allergy to penicillin  Gary Nurse denies tobacco, alcohol, or illicit drug use during the pregnancy  The family genetic history is negative with respect to genetic   abnormalities, birth defects, or mental retardation  The family medical   history is negative with respect to first degree relatives with diabetes,   hypertension, or venous thromboembolism  Cardiac motion was observed at 151 bpm       INDICATIONS      second trimester bleeding      Exam Types      Level I      RESULTS      Fetus # 1 of 1   Vertex presentation   Fetal growth appeared normal   Placenta Location = Anterior   Placenta Grade = 0      MEASUREMENTS (* Included In Average GA)      AC               9 0 cm        14 weeks 6 days* (59%)   BPD              2 8 cm        15 weeks 1 day *   HC              10 3 cm        14 weeks 6 days*   Femur            1 8 cm        15 weeks 1 day *      HC/AC           1 15   FL/AC           0 20   FL/BPD          0 62   EFW (Ac/Fl/Hc)   114 grams - 0 lbs 4 oz      THE AVERAGE GESTATIONAL AGE is 15 weeks 0 days +/- 7 days  AMNIOTIC FLUID         Largest Vertical Pocket = 3 7 cm   Amniotic Fluid: Normal      ANATOMY      Head                                    See Details   Face/Neck                               See Details   Th  Cav  See Details   Heart                                   See Details   Stomach                                 Normal   Right Kidney                            Not Visualized   Left Kidney                             Not Visualized   Bladder                                 Normal   Abd  Wall                               Not Visualized   Spine                                   See Details   Extrems                                 See Details   Genitalia                               Normal   Placenta                                Normal   Umbl   Cord                              Normal   Uterus                                  Normal   PCI Normal      ANATOMY DETAILS      Visualized Appearing Sonographically Normal:   HEAD: (Calvarium, BPD Level, Choroid Plexus);    FACE/NECK: (Neck,   Profile);    TH  CAV : (Diaphragm); HEART: (Four Chamber View, Proximal   Left Outflow, Proximal Right Outflow, 3 Vessel Trachea, Aortic Arch);      STOMACH, BLADDER, EXTREMS: (Lt Humerus, Rt Humerus, Lt Femur, Rt Femur);      GENITALIA (Male), PLACENTA, UMBL  CORD, UTERUS, PCI      Not Visualized:   HEAD: (Cavum, Lateral Ventricles, Cerebellum, Cisterna Magna);      FACE/NECK: (Nuchal Fold, Orbits, Nose/Lips, Palate);    RIGHT KIDNEY, LEFT   KIDNEY, ABD  WALL, EXTREMS: (Lt Forearm, Rt Forearm, Lt Hand, Rt Hand, Lt   Low Leg, Rt Low Leg, Lt Foot, Rt Foot)      ADNEXA      The left ovary appeared normal and measured 2 1 x 1 7 x 1 5 cm with a   volume of 2 8 cc  The right ovary appeared normal and measured 2 5 x 1 8 x   1 0 cm with a volume of 2 4 cc  IMPRESSION      [facility]ark IUP   15 weeks and 0 days by this ultrasound  (KWESI=JAN 11 2018)   Vertex presentation   Fetal growth appeared normal   Regular fetal heart rate of 151 bpm   Anterior placenta      GENERAL COMMENT      No fetal structural abnormality or ultrasound marker for aneuploidy is   identified on the ultrasound study today  Fetal growth and amniotic fluid   volume are normal   The placenta is normal in appearance  Today's ultrasound findings and suggested follow-up were discussed in   detail with Hemant Gannon  She will return to the [facility]  in 6 weeks for   level II ultrasound evaluation  The face to face time, in addition to time spent discussing ultrasound   results, was approximately 10 minutes, greater than 50% of which was spent   during counseling and coordination of care  LEXY Krause M D  Maternal-Fetal Medicine   Electronically signed 07/22/17 17:28           Electronically signed by:Chase FELIZ    Jul 24 2017 12:53PM EST

## 2018-01-16 NOTE — MISCELLANEOUS
Message   Recorded as Task   Date: 07/03/2017 09:46 AM, Created By: Darrick Pineda   Task Name: Follow Up   Assigned To: Chanda Mesa   Regarding Patient: Chavez Napier, Status: In Progress   CommentAldeniz Clayton - 03 Jul 2017 9:46 AM     TASK CREATED  [facility] (at the ) spoke with pt who reports bleeding  Attempted to reach pt and could not leave VM  Darrick Pineda - 80 Jul 2017 9:46 AM     TASK IN PROGRESS   Darrick Pineda - 03 Jul 2017 9:57 AM     TASK EDITED  Spoke with pt  States she saw slight spotting after urinating this morning  States the urine in the bowl was slightly pink  She denies accumulation of blood and denies seeing any blood on TP  She has mild "uncomfortable" cramping  Denies severe pains  Advised pt to hydrate, take it easy, wear clean dry panty liner  Pt to c/b in 2 hours to check in or sooner with heavy bleeding or severe pains  Darrick Pineda - 14 Jul 2017 1:23 PM     TASK EDITED  Spoke with pt  States she had one further episode of light pink spotting  Cramping is light  - denies severe pains  Advised pt to continue to monitor, pelvic rest, increased hydration  To c/b with severe pains or heavy bleeding  She has pending apt Wednesday  Active Problems    1  Pregnancy (V22 2) (Z33 1)   2  Pregnancy, first, obstetrical care (V22 0) (Z34 00)    Current Meds   1  Prenatal Vitamins TABS; Therapy: (Recorded:32Kws6208) to Recorded    Allergies    1   Penicillins    Signatures   Electronically signed by : Swati Sanchez, ; Jul  3 2017  1:23PM EST                       (Author)

## 2018-01-16 NOTE — MISCELLANEOUS
Message   Recorded as Task   Date: 10/11/2017 07:12 AM, Created By: Arya Thacker   Task Name: Go to Result   Assigned To: Bob Camacho   Regarding Patient: Phil Saunders, Status: In Progress   Comment:    Daisy Cao - 11 Oct 2017 7:12 AM     TASK CREATED  1hr GTT is normal  CBC shows anemia - please add iron one po daily  Thanks! Casandra,Raúl - 11 Oct 2017 8:29 AM     TASK IN PROGRESS   CasandraRaúl - 11 Oct 2017 8:32 AM     TASK EDITED  attempted to call pt-voicemessage stated  "mailbox is full, unable to leave message " will attempt to call again today  Active Problems    1  Bacterial vaginosis (616 10,041 9) (N76 0,B96 89)   2  Left breast lump (611 72) (N63 20)   3  Pregnancy (V22 2) (Z34 90)   4  Pregnancy, first, obstetrical care (V22 0) (Z34 00)   5  Pregnancy, obstetrical care (V22 1) (Z34 90)   6  Second trimester bleeding (641 93) (O46 92)    Current Meds   1  Prenatal Vitamins TABS; Therapy: (Recorded:38Uwu0220) to Recorded   2  Tylenol 325 MG Oral Tablet; TAKE TABLET  PRN; Therapy: (Recorded:98Zez9824) to Recorded    Allergies    1  Penicillins    2  Peanuts   3   Seasonal    Signatures   Electronically signed by : Geetha Connolly RN; Oct 11 2017  8:32AM EST                       (Author)

## 2018-01-17 ENCOUNTER — HOSPITAL ENCOUNTER (INPATIENT)
Facility: HOSPITAL | Age: 33
LOS: 4 days | Discharge: HOME | End: 2018-01-21
Attending: OBSTETRICS & GYNECOLOGY | Admitting: OBSTETRICS & GYNECOLOGY
Payer: COMMERCIAL

## 2018-01-17 ENCOUNTER — ANESTHESIA (INPATIENT)
Dept: LABOR AND DELIVERY | Facility: HOSPITAL | Age: 33
End: 2018-01-17
Payer: COMMERCIAL

## 2018-01-17 ENCOUNTER — ANESTHESIA EVENT (INPATIENT)
Dept: LABOR AND DELIVERY | Facility: HOSPITAL | Age: 33
End: 2018-01-17
Payer: COMMERCIAL

## 2018-01-17 DIAGNOSIS — Z3A.41 41 WEEKS GESTATION OF PREGNANCY: Primary | ICD-10-CM

## 2018-01-17 DIAGNOSIS — Z98.891 HISTORY OF LOW TRANSVERSE CESAREAN SECTION: ICD-10-CM

## 2018-01-17 PROBLEM — O09.899 RUBELLA NON-IMMUNE STATUS, ANTEPARTUM: Status: ACTIVE | Noted: 2017-10-20

## 2018-01-17 PROBLEM — Z28.39 RUBELLA NON-IMMUNE STATUS, ANTEPARTUM: Status: ACTIVE | Noted: 2017-10-20

## 2018-01-17 PROBLEM — O48.0 41 WEEKS GESTATION OF PREGNANCY: Status: ACTIVE | Noted: 2018-01-17

## 2018-01-17 LAB
ABO GROUP BLD: NORMAL
BASOPHILS # BLD AUTO: 0 THOUSANDS/ΜL (ref 0–0.1)
BASOPHILS NFR BLD AUTO: 0 % (ref 0–1)
BLD GP AB SCN SERPL QL: NEGATIVE
EOSINOPHIL # BLD AUTO: 0.06 THOUSAND/ΜL (ref 0–0.61)
EOSINOPHIL NFR BLD AUTO: 1 % (ref 0–6)
ERYTHROCYTE [DISTWIDTH] IN BLOOD BY AUTOMATED COUNT: 13.8 % (ref 11.6–15.1)
HCT VFR BLD AUTO: 33.2 % (ref 34.8–46.1)
HGB BLD-MCNC: 10.9 G/DL (ref 11.5–15.4)
LYMPHOCYTES # BLD AUTO: 1.29 THOUSANDS/ΜL (ref 0.6–4.47)
LYMPHOCYTES NFR BLD AUTO: 12 % (ref 14–44)
MCH RBC QN AUTO: 26.2 PG (ref 26.8–34.3)
MCHC RBC AUTO-ENTMCNC: 32.8 G/DL (ref 31.4–37.4)
MCV RBC AUTO: 80 FL (ref 82–98)
MONOCYTES # BLD AUTO: 0.41 THOUSAND/ΜL (ref 0.17–1.22)
MONOCYTES NFR BLD AUTO: 4 % (ref 4–12)
NEUTROPHILS # BLD AUTO: 9.37 THOUSANDS/ΜL (ref 1.85–7.62)
NEUTS SEG NFR BLD AUTO: 83 % (ref 43–75)
NRBC BLD AUTO-RTO: 0 /100 WBCS
PLATELET # BLD AUTO: 230 THOUSANDS/UL (ref 149–390)
PMV BLD AUTO: 10.5 FL (ref 8.9–12.7)
RBC # BLD AUTO: 4.16 MILLION/UL (ref 3.81–5.12)
RH BLD: POSITIVE
SPECIMEN EXPIRATION DATE: NORMAL
WBC # BLD AUTO: 11.18 THOUSAND/UL (ref 4.31–10.16)

## 2018-01-17 PROCEDURE — 99214 OFFICE O/P EST MOD 30 MIN: CPT

## 2018-01-17 PROCEDURE — 86592 SYPHILIS TEST NON-TREP QUAL: CPT | Performed by: OBSTETRICS & GYNECOLOGY

## 2018-01-17 PROCEDURE — 86901 BLOOD TYPING SEROLOGIC RH(D): CPT | Performed by: OBSTETRICS & GYNECOLOGY

## 2018-01-17 PROCEDURE — 10H07YZ INSERTION OF OTHER DEVICE INTO PRODUCTS OF CONCEPTION, VIA NATURAL OR ARTIFICIAL OPENING: ICD-10-PCS | Performed by: OBSTETRICS & GYNECOLOGY

## 2018-01-17 PROCEDURE — 86850 RBC ANTIBODY SCREEN: CPT | Performed by: OBSTETRICS & GYNECOLOGY

## 2018-01-17 PROCEDURE — G0463 HOSPITAL OUTPT CLINIC VISIT: HCPCS

## 2018-01-17 PROCEDURE — 3E033VJ INTRODUCTION OF OTHER HORMONE INTO PERIPHERAL VEIN, PERCUTANEOUS APPROACH: ICD-10-PCS | Performed by: OBSTETRICS & GYNECOLOGY

## 2018-01-17 PROCEDURE — 85025 COMPLETE CBC W/AUTO DIFF WBC: CPT | Performed by: OBSTETRICS & GYNECOLOGY

## 2018-01-17 PROCEDURE — 3E0E77Z INTRODUCTION OF ELECTROLYTIC AND WATER BALANCE SUBSTANCE INTO PRODUCTS OF CONCEPTION, VIA NATURAL OR ARTIFICIAL OPENING: ICD-10-PCS | Performed by: OBSTETRICS & GYNECOLOGY

## 2018-01-17 PROCEDURE — 86900 BLOOD TYPING SEROLOGIC ABO: CPT | Performed by: OBSTETRICS & GYNECOLOGY

## 2018-01-17 PROCEDURE — 4A1HXCZ MONITORING OF PRODUCTS OF CONCEPTION, CARDIAC RATE, EXTERNAL APPROACH: ICD-10-PCS | Performed by: OBSTETRICS & GYNECOLOGY

## 2018-01-17 RX ORDER — SODIUM CHLORIDE, SODIUM LACTATE, POTASSIUM CHLORIDE, CALCIUM CHLORIDE 600; 310; 30; 20 MG/100ML; MG/100ML; MG/100ML; MG/100ML
125 INJECTION, SOLUTION INTRAVENOUS CONTINUOUS
Status: DISCONTINUED | OUTPATIENT
Start: 2018-01-17 | End: 2018-01-18 | Stop reason: SDUPTHER

## 2018-01-17 RX ORDER — OXYTOCIN/RINGER'S LACTATE 30/500 ML
1-30 PLASTIC BAG, INJECTION (ML) INTRAVENOUS
Status: DISCONTINUED | OUTPATIENT
Start: 2018-01-17 | End: 2018-01-18

## 2018-01-17 RX ADMIN — SODIUM CHLORIDE, SODIUM LACTATE, POTASSIUM CHLORIDE, AND CALCIUM CHLORIDE 125 ML/HR: .6; .31; .03; .02 INJECTION, SOLUTION INTRAVENOUS at 22:21

## 2018-01-17 RX ADMIN — SODIUM CHLORIDE, SODIUM LACTATE, POTASSIUM CHLORIDE, AND CALCIUM CHLORIDE 125 ML/HR: .6; .31; .03; .02 INJECTION, SOLUTION INTRAVENOUS at 19:43

## 2018-01-17 RX ADMIN — SODIUM CHLORIDE, SODIUM LACTATE, POTASSIUM CHLORIDE, AND CALCIUM CHLORIDE 999 ML/HR: .6; .31; .03; .02 INJECTION, SOLUTION INTRAVENOUS at 16:51

## 2018-01-17 RX ADMIN — SODIUM CHLORIDE, SODIUM LACTATE, POTASSIUM CHLORIDE, AND CALCIUM CHLORIDE 125 ML/HR: .6; .31; .03; .02 INJECTION, SOLUTION INTRAVENOUS at 12:00

## 2018-01-17 RX ADMIN — SODIUM CHLORIDE, SODIUM LACTATE, POTASSIUM CHLORIDE, AND CALCIUM CHLORIDE 500 ML: .6; .31; .03; .02 INJECTION, SOLUTION INTRAVENOUS at 21:45

## 2018-01-17 RX ADMIN — ROPIVACAINE HYDROCHLORIDE: 2 INJECTION, SOLUTION EPIDURAL; INFILTRATION at 17:08

## 2018-01-17 RX ADMIN — Medication 2 MILLI-UNITS/MIN: at 17:24

## 2018-01-17 RX ADMIN — LIDOCAINE HYDROCHLORIDE AND EPINEPHRINE 5 ML: 20; 5 INJECTION, SOLUTION EPIDURAL; INFILTRATION; INTRACAUDAL; PERINEURAL at 23:58

## 2018-01-17 RX ADMIN — SODIUM CHLORIDE, SODIUM LACTATE, POTASSIUM CHLORIDE, AND CALCIUM CHLORIDE: .6; .31; .03; .02 INJECTION, SOLUTION INTRAVENOUS at 23:58

## 2018-01-17 NOTE — H&P
H&P Exam - Obstetrics   Jaimie Shaikh 33 y o  female MRN: [id number]  Unit/Bed#: LD Triage - Encounter: [id number]    Assessment/Plan     Assessment:  28yo  @ 41 1wks, prior  desires TOLAC, for IOL late term    Plan: Owusu balloon for cervical ripening followed by pitocin IOL      History of Present Illness   Chief Complaint: Induction of labor, late term gestation, desire for trial of labor after     HPI:  Jaimie Shaikh is a 33 y o   female with an KWESI of 2018, by Last Menstrual Period at 41w1d weeks gestation who is being admitted for induction of labor for late term gestation, desiring trial of labor after   Her current obstetrical history is significant for prior   Contractions: Date/time of onset: since this morning  Leakage of fluid: None  Bleeding: None  Fetal movement: present  Pregnancy complications: see above    Review of Systems   Constitutional: Negative for chills, diaphoresis and fever  Respiratory: Negative for cough and shortness of breath  Cardiovascular: Negative for chest pain and palpitations  Gastrointestinal: Negative for abdominal pain, constipation, diarrhea, nausea and vomiting         Historical Information   OB History    Para Term  AB Living   2 1 1     1   SAB TAB Ectopic Multiple Live Births           1      # Outcome Date GA Lbr Tom/2nd Weight Sex Delivery Anes PTL Lv   2 Current            1 Term 08/28/10 41w0d  3685 g (8 lb 2 oz) M CS-Unspec Spinal N JUSTYN      Obstetric Comments   Second trimester bleeding, new paternity, prior       Baby complications/comments:   Past Medical History:   Diagnosis Date    Peanut allergy     Seasonal allergies     Varicella     had as child before 9years old      Past Surgical History:   Procedure Laterality Date     SECTION      CYST REMOVAL      left breast, benign     Social History   History   Alcohol Use No     History   Drug Use No     History Smoking Status    Never Smoker   Smokeless Tobacco    Never Used     Family History: non-contributory    Meds/Allergies   all medications and allergies reviewed  Allergies   Allergen Reactions    Other Shortness Of Breath     Peanuts    Penicillins Hives       Objective   Vitals: Blood pressure 107/57, pulse 80, temperature 98 1 °F (36 7 °C), temperature source Oral, resp  rate 18, height 5' 6" (1 676 m), weight 60 3 kg (133 lb), last menstrual period 04/04/2017, SpO2 100 %, currently breastfeeding  Body mass index is 21 47 kg/m²  Invasive Devices          No matching active lines, drains, or airways          Physical Exam   Constitutional: She is oriented to person, place, and time  She appears well-developed and well-nourished  Cardiovascular: Normal rate, regular rhythm and normal heart sounds  Pulmonary/Chest: Effort normal and breath sounds normal    Abdominal: Soft  Bowel sounds are normal  She exhibits distension  Gravid   Neurological: She is alert and oriented to person, place, and time  Prenatal Labs: I have personally reviewed pertinent reports  Imaging, EKG, Pathology, and Other Studies: I have personally reviewed pertinent reports           Bobby Mello DO

## 2018-01-17 NOTE — ANESTHESIA PROCEDURE NOTES
Epidural Block    Patient location during procedure: OB  Start time: 1/17/2018 4:45 PM  Reason for block: procedure for pain and at surgeon's request  Staffing  Anesthesiologist: Donna Steinberg  Performed: anesthesiologist   Preanesthetic Checklist  Completed: patient identified, site marked, surgical consent, pre-op evaluation, timeout performed, IV checked, risks and benefits discussed and monitors and equipment checked  Epidural  Patient position: sitting  Prep: ChloraPrep  Patient monitoring: cardiac monitor and frequent blood pressure checks  Approach: midline  Location: lumbar (1-5)  Injection technique: DINAH air  Needle  Needle type: Tuohy   Needle gauge: 18 G  Catheter type: side hole  Catheter size: 20 G  Test dose: negative and lidocaine 1 5% with epinephrine 1-to-200,000negative aspiration for CSF, negative aspiration for heme and no paresthesia on injection  patient tolerated the procedure well with no immediate complications

## 2018-01-17 NOTE — MISCELLANEOUS
Message   Recorded as Task   Date: 07/24/2017 01:29 PM, Created By: Lety Willson   Task Name: Go to Result   Assigned To: Eliazarmikejt Bustillo   Regarding Patient: Lorene Fulton, Status: In Progress   Comment:    ShalomChase - 24 Jul 2017 1:29 PM     TASK CREATED  Please notify patient Pap smear is ASCUS with a positive HPV  Recommend colposcopy  Please schedule with me  Payal Larose - 24 Jul 2017 1:37 PM     TASK IN PROGRESS   Payal Larose - 24 Jul 2017 1:42 PM     TASK EDITED  Spoke with pt - she is aware of pap results  - she has an appointment this wed - will do the colpo then  Active Problems    1  Bacterial vaginosis (616 10,041 9) (N76 0,B96 89)   2  Left breast lump (611 72) (N63)   3  Pregnancy (V22 2) (Z34 90)   4  Pregnancy, first, obstetrical care (V22 0) (Z34 00)   5  Second trimester bleeding (641 93) (O46 92)    Current Meds   1  Prenatal Vitamins TABS; Therapy: (Recorded:87Non0930) to Recorded   2  Tylenol 325 MG Oral Tablet; TAKE TABLET  PRN; Therapy: (Recorded:56Jxd6043) to Recorded    Allergies    1  Penicillins    2  Peanuts   3   Seasonal    Signatures   Electronically signed by : Renetta Reyes, ; Jul 24 2017  1:43PM EST                       (Author)

## 2018-01-17 NOTE — OB LABOR/OXYTOCIN SAFETY PROGRESS
Labor Progress Note - Ankit Ashby 33 y o  female MRN: [id number]    Unit/Bed#: -01 Encounter: [id number]    Obstetric History       T1      L1     SAB0   TAB0   Ectopic0   Multiple0   Live Births1    Obstetric Comments   Second trimester bleeding, new paternity, prior       Gestational Age: 40w1d     Contraction Frequency (minutes): 2-4  Contraction Quality: Moderate  Tachysystole: No   Dilation: 6        Effacement (%): 70  Station: -1  Baseline Rate: 150 bpm  Fetal Heart Rate: 143 BPM  FHR Category: Category I          Notes/comments:      Feeling more rectal pressure  Cervical changed noted  For epidural, pitocin titration    Discussed with Dr Felisha Mckeon DO 2018 4:47 PM

## 2018-01-17 NOTE — PROGRESS NOTES
Triage Note - OB  Birgit Rose 33 y o  female MRN: [id number]  Unit/Bed#: LD Triage  Encounter: [id number]    Chief Complaint: contractions, leaking of fluid    KWESI: Estimated Date of Delivery: 18    HPI: Patient is a  at [address] here complaining of small amount of cloudly LOF as well as uterine contractions since this morning  She denies vaginal bleeding  Good fetal movement  Pregnancy is complicated by prior  for NRFHT when she reached complete dilation  She desires TOLAC  Vitals: Blood pressure 112/72, pulse 92, temperature (!) 97 4 °F (36 3 °C), temperature source Tympanic, last menstrual period 2017, SpO2 100 %, currently breastfeeding  ,There is no height or weight on file to calculate BMI  Physical Exam  GEN: Mo acute distress, not uncomfortable  SSE:  + cervical mucous, + pooling, - nitrazine, - ferning  SVE: Dilation: 1cm, Effacement:  80%, Station:  -2, Consistency: soft and Position:  posterior    FHR: Baseline: 130 bpm, Variability: Moderate 6 - 25 bpm, Accelerations: Reactive  Almond: irregular, every 6-10 minutes    Labs: No results found for this or any previous visit (from the past 24 hour(s))  Imaging: none    Lab, Imaging and other studies: I have personally reviewed pertinent reports      A/P: 33 y o  female at [address] - rule out labor/rupture  - not ruptured  - will admit for IOL due to late term status

## 2018-01-17 NOTE — ANESTHESIA PREPROCEDURE EVALUATION
Review of Systems/Medical History  Patient summary reviewed        Cardiovascular  Negative cardio ROS    Pulmonary  Negative pulmonary ROS        GI/Hepatic  Negative GI/hepatic ROS          Negative  ROS        Endo/Other  Negative endo/other ROS      GYN  Negative gynecology ROS          Hematology  Negative hematology ROS      Musculoskeletal  Negative musculoskeletal ROS        Neurology  Negative neurology ROS      Psychology   Negative psychology ROS              Physical Exam    Airway    Mallampati score: II  TM Distance: >3 FB  Neck ROM: full     Dental       Cardiovascular  Comment: Negative ROS,     Pulmonary      Other Findings        Anesthesia Plan  ASA Score- 2     Anesthesia Type- epidural with ASA Monitors  Additional Monitors:   Airway Plan:     Comment: Patient examined, history reviewed  Labor epidural explained, risks and benefits discussed  Consent has been signed        Plan Factors-    Induction-     Postoperative Plan-     Informed Consent- Anesthetic plan and risks discussed with patient

## 2018-01-17 NOTE — OB LABOR/OXYTOCIN SAFETY PROGRESS
Labor Progress Note - Kathleen Fong 33 y o  female MRN: [id number]    Unit/Bed#: LD Triage  Encounter: [id number]    Obstetric History       T1      L1     SAB0   TAB0   Ectopic0   Multiple0   Live Births1    Obstetric Comments   Second trimester bleeding, new paternity, prior       Gestational Age: 40w1d     Contraction Frequency (minutes): 3  Contraction Quality: Moderate, Strong  Tachysystole: No   Dilation: 2        Effacement (%): 60  Station: -2  Baseline Rate: 140 bpm  Fetal Heart Rate: 150 BPM  FHR Category: Category I         Patient endorsing more discomfort with contractions  Some cervical change appreciated    Valenzuela Bulb Placement    A 24F valenzuela with a 30cc balloon was selected  SVE was performed and cervix was located  Valenzuela was introduced over sterile gloved hands  Balloon advanced through cervix beyond the internal cervical os  A small amount amount of sterile normal saline solution was instilled in the balloon to confirm placement  Placement was confirmed to be beyond the internal cervical os  A total of 60cc of sterile normal saline solution was instilled into the balloon  Patient tolerated well  Instructions left with RN to place valenzuela to gravity with a 1L bag of IV fluid  Notify MD when valenzuela dislodged      Discussed with Dr Carlie Lopez      Notes/comments:             DO Sascha 2018 1:51 PM

## 2018-01-18 PROBLEM — Z98.891 S/P REPEAT LOW TRANSVERSE C-SECTION: Status: ACTIVE | Noted: 2018-01-18

## 2018-01-18 LAB
BASE EXCESS BLDCOA CALC-SCNC: -6.3 MMOL/L (ref 3–11)
BASE EXCESS BLDCOV CALC-SCNC: -4.3 MMOL/L (ref 1–9)
HCO3 BLDCOA-SCNC: 21.6 MMOL/L (ref 17.3–27.3)
HCO3 BLDCOV-SCNC: 22.7 MMOL/L (ref 12.2–28.6)
O2 CT VFR BLDCOA CALC: 1.1 ML/DL
OXYHGB MFR BLDCOA: 5.1 %
OXYHGB MFR BLDCOV: 47.7 %
PCO2 BLDCOA: 51.7 MM[HG] (ref 30–60)
PCO2 BLDCOV: 48.6 MM HG (ref 27–43)
PH BLDCOA: 7.24 [PH] (ref 7.23–7.43)
PH BLDCOV: 7.29 [PH] (ref 7.19–7.49)
PO2 BLDCOA: 12.3 MM HG (ref 5–25)
PO2 BLDCOV: 22 MM HG (ref 15–45)
RPR SER QL: NORMAL
SAO2 % BLDCOV: 11.1 ML/DL

## 2018-01-18 PROCEDURE — 82805 BLOOD GASES W/O2 SATURATION: CPT | Performed by: OBSTETRICS & GYNECOLOGY

## 2018-01-18 RX ORDER — IBUPROFEN 600 MG/1
600 TABLET ORAL EVERY 6 HOURS PRN
Status: DISCONTINUED | OUTPATIENT
Start: 2018-01-18 | End: 2018-01-21 | Stop reason: HOSPADM

## 2018-01-18 RX ORDER — DIPHENHYDRAMINE HYDROCHLORIDE 50 MG/ML
25 INJECTION INTRAMUSCULAR; INTRAVENOUS EVERY 6 HOURS PRN
Status: ACTIVE | OUTPATIENT
Start: 2018-01-18 | End: 2018-01-19

## 2018-01-18 RX ORDER — METOCLOPRAMIDE HYDROCHLORIDE 5 MG/ML
5 INJECTION INTRAMUSCULAR; INTRAVENOUS EVERY 6 HOURS PRN
Status: ACTIVE | OUTPATIENT
Start: 2018-01-18 | End: 2018-01-19

## 2018-01-18 RX ORDER — DIAPER,BRIEF,INFANT-TODD,DISP
1 EACH MISCELLANEOUS DAILY PRN
Status: DISCONTINUED | OUTPATIENT
Start: 2018-01-18 | End: 2018-01-21 | Stop reason: HOSPADM

## 2018-01-18 RX ORDER — CALCIUM CARBONATE 200(500)MG
1000 TABLET,CHEWABLE ORAL DAILY PRN
Status: DISCONTINUED | OUTPATIENT
Start: 2018-01-18 | End: 2018-01-21 | Stop reason: HOSPADM

## 2018-01-18 RX ORDER — ALBUTEROL SULFATE 2.5 MG/3ML
2.5 SOLUTION RESPIRATORY (INHALATION) ONCE AS NEEDED
Status: DISCONTINUED | OUTPATIENT
Start: 2018-01-18 | End: 2018-01-18

## 2018-01-18 RX ORDER — ONDANSETRON 2 MG/ML
4 INJECTION INTRAMUSCULAR; INTRAVENOUS EVERY 8 HOURS PRN
Status: DISCONTINUED | OUTPATIENT
Start: 2018-01-18 | End: 2018-01-21 | Stop reason: HOSPADM

## 2018-01-18 RX ORDER — NALBUPHINE HCL 10 MG/ML
5 AMPUL (ML) INJECTION
Status: DISPENSED | OUTPATIENT
Start: 2018-01-18 | End: 2018-01-19

## 2018-01-18 RX ORDER — DOCUSATE SODIUM 100 MG/1
100 CAPSULE, LIQUID FILLED ORAL 2 TIMES DAILY PRN
Status: DISCONTINUED | OUTPATIENT
Start: 2018-01-18 | End: 2018-01-21 | Stop reason: HOSPADM

## 2018-01-18 RX ORDER — SIMETHICONE 80 MG
80 TABLET,CHEWABLE ORAL 4 TIMES DAILY PRN
Status: DISCONTINUED | OUTPATIENT
Start: 2018-01-18 | End: 2018-01-21 | Stop reason: HOSPADM

## 2018-01-18 RX ORDER — SODIUM CHLORIDE, SODIUM LACTATE, POTASSIUM CHLORIDE, CALCIUM CHLORIDE 600; 310; 30; 20 MG/100ML; MG/100ML; MG/100ML; MG/100ML
125 INJECTION, SOLUTION INTRAVENOUS CONTINUOUS
Status: DISCONTINUED | OUTPATIENT
Start: 2018-01-18 | End: 2018-01-21 | Stop reason: HOSPADM

## 2018-01-18 RX ORDER — KETOROLAC TROMETHAMINE 30 MG/ML
15 INJECTION, SOLUTION INTRAMUSCULAR; INTRAVENOUS EVERY 6 HOURS
Status: DISPENSED | OUTPATIENT
Start: 2018-01-18 | End: 2018-01-19

## 2018-01-18 RX ORDER — ACETAMINOPHEN 325 MG/1
650 TABLET ORAL EVERY 6 HOURS PRN
Status: DISCONTINUED | OUTPATIENT
Start: 2018-01-18 | End: 2018-01-21 | Stop reason: HOSPADM

## 2018-01-18 RX ORDER — LIDOCAINE HYDROCHLORIDE AND EPINEPHRINE 20; 5 MG/ML; UG/ML
INJECTION, SOLUTION EPIDURAL; INFILTRATION; INTRACAUDAL; PERINEURAL AS NEEDED
Status: DISCONTINUED | OUTPATIENT
Start: 2018-01-17 | End: 2018-01-18 | Stop reason: SURG

## 2018-01-18 RX ORDER — KETOROLAC TROMETHAMINE 30 MG/ML
INJECTION, SOLUTION INTRAMUSCULAR; INTRAVENOUS AS NEEDED
Status: DISCONTINUED | OUTPATIENT
Start: 2018-01-18 | End: 2018-01-18 | Stop reason: SURG

## 2018-01-18 RX ORDER — TRISODIUM CITRATE DIHYDRATE AND CITRIC ACID MONOHYDRATE 500; 334 MG/5ML; MG/5ML
30 SOLUTION ORAL 4 TIMES DAILY PRN
Status: DISCONTINUED | OUTPATIENT
Start: 2018-01-18 | End: 2018-01-21 | Stop reason: HOSPADM

## 2018-01-18 RX ORDER — BISACODYL 10 MG
10 SUPPOSITORY, RECTAL RECTAL DAILY PRN
Status: DISCONTINUED | OUTPATIENT
Start: 2018-01-18 | End: 2018-01-21 | Stop reason: HOSPADM

## 2018-01-18 RX ORDER — DEXAMETHASONE SODIUM PHOSPHATE 4 MG/ML
8 INJECTION, SOLUTION INTRA-ARTICULAR; INTRALESIONAL; INTRAMUSCULAR; INTRAVENOUS; SOFT TISSUE ONCE AS NEEDED
Status: ACTIVE | OUTPATIENT
Start: 2018-01-18 | End: 2018-01-19

## 2018-01-18 RX ORDER — SENNOSIDES 8.6 MG
1 TABLET ORAL
Status: DISCONTINUED | OUTPATIENT
Start: 2018-01-18 | End: 2018-01-21 | Stop reason: HOSPADM

## 2018-01-18 RX ORDER — OXYCODONE HYDROCHLORIDE AND ACETAMINOPHEN 5; 325 MG/1; MG/1
2 TABLET ORAL EVERY 4 HOURS PRN
Status: DISCONTINUED | OUTPATIENT
Start: 2018-01-18 | End: 2018-01-19

## 2018-01-18 RX ORDER — DIPHENHYDRAMINE HCL 25 MG
25 TABLET ORAL EVERY 6 HOURS PRN
Status: DISCONTINUED | OUTPATIENT
Start: 2018-01-18 | End: 2018-01-21 | Stop reason: HOSPADM

## 2018-01-18 RX ORDER — MORPHINE SULFATE 1 MG/ML
INJECTION, SOLUTION EPIDURAL; INTRATHECAL; INTRAVENOUS AS NEEDED
Status: DISCONTINUED | OUTPATIENT
Start: 2018-01-18 | End: 2018-01-18 | Stop reason: SURG

## 2018-01-18 RX ORDER — ONDANSETRON 2 MG/ML
4 INJECTION INTRAMUSCULAR; INTRAVENOUS ONCE AS NEEDED
Status: DISCONTINUED | OUTPATIENT
Start: 2018-01-18 | End: 2018-01-21 | Stop reason: HOSPADM

## 2018-01-18 RX ORDER — MAGNESIUM HYDROXIDE/ALUMINUM HYDROXICE/SIMETHICONE 120; 1200; 1200 MG/30ML; MG/30ML; MG/30ML
15 SUSPENSION ORAL EVERY 6 HOURS PRN
Status: DISCONTINUED | OUTPATIENT
Start: 2018-01-18 | End: 2018-01-21 | Stop reason: HOSPADM

## 2018-01-18 RX ORDER — OXYCODONE HYDROCHLORIDE AND ACETAMINOPHEN 5; 325 MG/1; MG/1
1 TABLET ORAL EVERY 4 HOURS PRN
Status: DISCONTINUED | OUTPATIENT
Start: 2018-01-18 | End: 2018-01-19

## 2018-01-18 RX ORDER — ONDANSETRON 2 MG/ML
4 INJECTION INTRAMUSCULAR; INTRAVENOUS EVERY 4 HOURS PRN
Status: ACTIVE | OUTPATIENT
Start: 2018-01-18 | End: 2018-01-19

## 2018-01-18 RX ORDER — ONDANSETRON 2 MG/ML
INJECTION INTRAMUSCULAR; INTRAVENOUS AS NEEDED
Status: DISCONTINUED | OUTPATIENT
Start: 2018-01-18 | End: 2018-01-18 | Stop reason: SURG

## 2018-01-18 RX ORDER — OXYTOCIN/RINGER'S LACTATE 30/500 ML
62.5 PLASTIC BAG, INJECTION (ML) INTRAVENOUS ONCE
Status: COMPLETED | OUTPATIENT
Start: 2018-01-18 | End: 2018-01-18

## 2018-01-18 RX ORDER — GLYCOPYRROLATE 0.2 MG/ML
INJECTION INTRAMUSCULAR; INTRAVENOUS AS NEEDED
Status: DISCONTINUED | OUTPATIENT
Start: 2018-01-18 | End: 2018-01-18 | Stop reason: SURG

## 2018-01-18 RX ORDER — MEPERIDINE HYDROCHLORIDE 25 MG/ML
12.5 INJECTION INTRAMUSCULAR; INTRAVENOUS; SUBCUTANEOUS AS NEEDED
Status: DISCONTINUED | OUTPATIENT
Start: 2018-01-18 | End: 2018-01-21 | Stop reason: HOSPADM

## 2018-01-18 RX ADMIN — KETOROLAC TROMETHAMINE 30 MG: 30 INJECTION, SOLUTION INTRAMUSCULAR at 00:29

## 2018-01-18 RX ADMIN — Medication 1 TABLET: at 08:23

## 2018-01-18 RX ADMIN — MORPHINE SULFATE 2.5 MG: 1 INJECTION, SOLUTION EPIDURAL; INTRATHECAL; INTRAVENOUS at 00:33

## 2018-01-18 RX ADMIN — ONDANSETRON 4 MG: 2 INJECTION INTRAMUSCULAR; INTRAVENOUS at 00:21

## 2018-01-18 RX ADMIN — PHENYLEPHRINE HYDROCHLORIDE 20 MCG/MIN: 10 INJECTION INTRAVENOUS at 00:14

## 2018-01-18 RX ADMIN — KETOROLAC TROMETHAMINE 15 MG: 30 INJECTION, SOLUTION INTRAMUSCULAR at 12:34

## 2018-01-18 RX ADMIN — LIDOCAINE HYDROCHLORIDE AND EPINEPHRINE 5 ML: 20; 5 INJECTION, SOLUTION EPIDURAL; INFILTRATION; INTRACAUDAL; PERINEURAL at 00:00

## 2018-01-18 RX ADMIN — DOCUSATE SODIUM 100 MG: 100 CAPSULE, LIQUID FILLED ORAL at 17:44

## 2018-01-18 RX ADMIN — LIDOCAINE HYDROCHLORIDE AND EPINEPHRINE 5 ML: 20; 5 INJECTION, SOLUTION EPIDURAL; INFILTRATION; INTRACAUDAL; PERINEURAL at 00:01

## 2018-01-18 RX ADMIN — MORPHINE SULFATE 2.5 MG: 1 INJECTION, SOLUTION EPIDURAL; INTRATHECAL; INTRAVENOUS at 00:40

## 2018-01-18 RX ADMIN — GLYCOPYRROLATE 0.2 MG: 0.2 INJECTION, SOLUTION INTRAMUSCULAR; INTRAVENOUS at 00:21

## 2018-01-18 RX ADMIN — SODIUM CHLORIDE, SODIUM LACTATE, POTASSIUM CHLORIDE, AND CALCIUM CHLORIDE 125 ML/HR: .6; .31; .03; .02 INJECTION, SOLUTION INTRAVENOUS at 01:29

## 2018-01-18 RX ADMIN — HYDROMORPHONE HYDROCHLORIDE 0.5 MG: 1 INJECTION, SOLUTION INTRAMUSCULAR; INTRAVENOUS; SUBCUTANEOUS at 03:00

## 2018-01-18 RX ADMIN — OXYTOCIN 250 MILLI-UNITS/MIN: 10 INJECTION, SOLUTION INTRAMUSCULAR; INTRAVENOUS at 00:20

## 2018-01-18 RX ADMIN — DOCUSATE SODIUM 100 MG: 100 CAPSULE, LIQUID FILLED ORAL at 08:23

## 2018-01-18 RX ADMIN — CEFAZOLIN SODIUM 1000 MG: 1 SOLUTION INTRAVENOUS at 00:02

## 2018-01-18 RX ADMIN — Medication 62.5 MILLI-UNITS/MIN: at 02:45

## 2018-01-18 RX ADMIN — SODIUM CHLORIDE, SODIUM LACTATE, POTASSIUM CHLORIDE, AND CALCIUM CHLORIDE: .6; .31; .03; .02 INJECTION, SOLUTION INTRAVENOUS at 00:35

## 2018-01-18 RX ADMIN — SODIUM CHLORIDE, SODIUM LACTATE, POTASSIUM CHLORIDE, AND CALCIUM CHLORIDE 125 ML/HR: .6; .31; .03; .02 INJECTION, SOLUTION INTRAVENOUS at 08:12

## 2018-01-18 RX ADMIN — AZITHROMYCIN 500 MG: 500 INJECTION, POWDER, LYOPHILIZED, FOR SOLUTION INTRAVENOUS at 00:02

## 2018-01-18 RX ADMIN — KETOROLAC TROMETHAMINE 15 MG: 30 INJECTION, SOLUTION INTRAMUSCULAR at 18:51

## 2018-01-18 RX ADMIN — KETOROLAC TROMETHAMINE 15 MG: 30 INJECTION, SOLUTION INTRAMUSCULAR at 06:15

## 2018-01-18 NOTE — OB LABOR/OXYTOCIN SAFETY PROGRESS
Oxytocin Safety Progress Check Note - Sara Interiano 33 y o  female MRN: [id number]    Unit/Bed#: -01 Encounter: [id number]    Obstetric History       T1      L1     SAB0   TAB0   Ectopic0   Multiple0   Live Births1    Obstetric Comments   Second trimester bleeding, new paternity, prior       Gestational Age: 41w1d  Dose (flavia-units/min) Oxytocin: 0 flavia-units/min  Contraction Frequency (minutes): 2-5  Contraction Quality: Moderate  Tachysystole: No   Dilation: 8        Effacement (%): 80  Station: -1  Baseline Rate: 160 bpm  Fetal Heart Rate: 162 BPM  FHR Category: Category II          Notes/comments:   Patient was noted to have recurrent late decelerations followed by 2 variable decelerations to a deanna of 80 beats per minute, fetal heart tones recovered with maternal repositioning and IV fluid bolus oxygen given and Pitocin was turned off  No cervical change on exam will go back for a repeat  section due to fetal intolerance to labor      Case discussed with Dr Carmen Fitch MD 2018 11:49 PM

## 2018-01-18 NOTE — PROGRESS NOTES
Progress Note - OB/GYN  Derrek Ibarra 33 y o  female MRN: [id number]  Unit/Bed#: -01 Encounter: [id number]      Subjective:  Patient has no current complaints  Pain is well controlled  Patient is currently voiding with a Owusu  She is not ambulating  She is  Breastfeeding  Lochia is heavy  Patient is not currently passing flatus and has had bowel movement  She is tolerating PO liquids, and denies nausea or vomitting  Patient denies fever, chills, chest pain, shortness of breath, or calf tenderness  Vitals: Blood pressure 130/63, pulse 84, temperature 98 7 °F (37 1 °C), temperature source Oral, resp  rate 18, height 5' 6" (1 676 m), weight 60 3 kg (133 lb), last menstrual period 04/04/2017, SpO2 97 %, currently breastfeeding  ,Body mass index is 21 47 kg/m²  I/O last 3 completed shifts: In: 3699 5 [I V :3699 5]  Out: 2500 [Urine:2000; Blood:500]  No intake/output data recorded        Invasive Devices     Peripheral Intravenous Line            Peripheral IV 01/17/18 Left Arm less than 1 day          Drain            Urethral Catheter Double-lumen 16 Fr  less than 1 day                Physical Exam:   GEN: NAD, in bed  HEART: RRR, S1 S2  LUNGS: CTABL   ABDOMEN: normal bowel sounds, soft, appropriate tenderness, no distention  : firm, U -1  Incision: Dressing clean and dry  EXTREMITIES: no tenderness or edema, +SCDs    Labs:   Recent Results (from the past 24 hour(s))   Type and screen and continue to monitor patient    Collection Time: 01/17/18 12:53 PM   Result Value Ref Range    ABO Grouping O     Rh Factor Positive     Antibody Screen Negative     Specimen Expiration Date 20180120    CBC and differential    Collection Time: 01/17/18 12:53 PM   Result Value Ref Range    WBC 11 18 (H) 4 31 - 10 16 Thousand/uL    RBC 4 16 3 81 - 5 12 Million/uL    Hemoglobin 10 9 (L) 11 5 - 15 4 g/dL    Hematocrit 33 2 (L) 34 8 - 46 1 %    MCV 80 (L) 82 - 98 fL    MCH 26 2 (L) 26 8 - 34 3 pg    MCHC 32 8 31 4 - 37 4 g/dL    RDW 13 8 11 6 - 15 1 %    MPV 10 5 8 9 - 12 7 fL    Platelets 491 660 - 632 Thousands/uL    nRBC 0 /100 WBCs    Neutrophils Relative 83 (H) 43 - 75 %    Lymphocytes Relative 12 (L) 14 - 44 %    Monocytes Relative 4 4 - 12 %    Eosinophils Relative 1 0 - 6 %    Basophils Relative 0 0 - 1 %    Neutrophils Absolute 9 37 (H) 1 85 - 7 62 Thousands/µL    Lymphocytes Absolute 1 29 0 60 - 4 47 Thousands/µL    Monocytes Absolute 0 41 0 17 - 1 22 Thousand/µL    Eosinophils Absolute 0 06 0 00 - 0 61 Thousand/µL    Basophils Absolute 0 00 0 00 - 0 10 Thousands/µL   Blood gas, venous, cord    Collection Time: 18 12:20 AM   Result Value Ref Range    pH, Cord Javon 7 288 7 190 - 7 490    pCO2, Cord Javon 48 6 (H) 27 0 - 43 0 mm HG    pO2, Cord Javon 22 0 15 0 - 45 0 mm HG    HCO3, Cord Javon 22 7 12 2 - 28 6 mmol/L    Base Exc, Cord Javon -4 3 (L) 1 0 - 9 0 mmol/L    O2 Cont, Cord Javon 11 1 mL/dL    O2 HGB,VENOUS CORD 47 7 %   Blood gas, arterial, cord    Collection Time: 18 12:20 AM   Result Value Ref Range    pH, Cord Art 7 238 7 230 - 7 430    pCO2, Cord Art 51 7 30 0 - 60 0    pO2, Cord Art 12 3 5 0 - 25 0 mm HG    HCO3, Cord Art 21 6 17 3 - 27 3 mmol/L    Base Exc, Cord Art -6 3 (L) 3 0 - 11 0 mmol/L    O2 Content, Cord Art 1 1 ml/dl    O2 Hgb, Arterial Cord 5 1 %       Patient Active Problem List   Diagnosis    41 weeks gestation of pregnancy    Rubella non-immune status, antepartum    History of low transverse  section    S/P repeat low transverse         A/P: POD # 1 s/p RLTCS after failed  for NRFHTs   1) Pain: s/p Duramorph  2) Heavy lochia: Pt had DEISY atony during , will cont to monitor  3) hematuria: given low fetal station, likely bladder trauma, will cont to monitor  4) Ppx: SCDs  5) FEN: regular diet  6) post op care: will plan for Owusu to d/c this afternoon    Sharda Westfall MD   OB/Gyn PGY-4  2018 7:01 AM

## 2018-01-18 NOTE — PLAN OF CARE
Knowledge Deficit     Verbalizes understanding of labor plan Completed          DISCHARGE PLANNING     Discharge to home or other facility with appropriate resources Progressing        INFECTION - ADULT     Absence or prevention of progression during hospitalization Progressing     Absence of fever/infection during neutropenic period Progressing        Knowledge Deficit     Patient/family/caregiver demonstrates understanding of disease process, treatment plan, medications, and discharge instructions Progressing        PAIN - ADULT     Verbalizes/displays adequate comfort level or baseline comfort level Progressing        POSTPARTUM     Experiences normal postpartum course Progressing     Appropriate maternal -  bonding Progressing     Establishment of infant feeding pattern Progressing     Incision(s), wounds(s) or drain site(s) healing without S/S of infection Progressing        SAFETY ADULT     Patient will remain free of falls Progressing     Maintain or return to baseline ADL function Progressing     Maintain or return mobility status to optimal level Progressing

## 2018-01-18 NOTE — DISCHARGE INSTRUCTIONS
Section   WHAT YOU SHOULD KNOW:   A  delivery, or , is abdominal surgery to deliver your baby  There are many reasons you may need a   · A  may be scheduled before labor if you had a  with your last baby  It may be scheduled if your baby is not positioned normally, or you are pregnant with more than 1 baby  · Your caregiver may perform an emergency  during labor to prevent life-threatening complications for you or your baby  A  may be done if your cervix does not dilate after several hours of active labor  · Other reasons for a  include maternal infections and problems with the placenta  AFTER YOU LEAVE:   Medicines:   · Prescription pain medicine  may be given  Ask how to take this medicine safely  · Acetaminophen  decreases pain and fever  It is available without a doctor's order  Ask how much to take and how often to take it  Follow directions  Acetaminophen can cause liver damage if not taken correctly  · NSAIDs  help decrease swelling and pain or fever  This medicine is available with or without a doctor's order  NSAIDs can cause stomach bleeding or kidney problems in certain people  If you take blood thinner medicine, always ask your obstetrician if NSAIDs are safe for you  Always read the medicine label and follow directions  · Take your medicine as directed  Contact your obstetrician (OB) if you think your medicine is not helping or if you have side effects  Tell him if you are allergic to any medicine  Keep a list of the medicines, vitamins, and herbs you take  Include the amounts, and when and why you take them  Bring the list or the pill bottles to follow-up visits  Carry your medicine list with you in case of an emergency  Follow up with your OB as directed: You may need to return to have your stitches or staples removed   Write down your questions so you remember to ask them during your visits  Wound care:  Carefully wash your wound with soap and water every day  Keep your wound clean and dry  Wear loose, comfortable clothes that do not rub against your wound  Ask your OB about bathing and showering  Drink plenty of liquids: You can lower your risk for a blood clot if you drink plenty of liquids  Ask how much liquid to drink each day and which liquids are best for you  Limit activity until you have fully recovered from surgery:   · Ask when it is safe for you to drive, walk up stairs, lift heavy objects, and have sex  · Ask when it is okay to exercise, and what types of exercise to do  Start slowly and do more as you get stronger  Contact your OB if:   · You have heavy vaginal bleeding that fills 1 or more sanitary pads in 1 hour  · You have a fever  · Your incision is swollen, red, or draining pus  · You have questions or concerns about yourself or your baby  Seek care immediately or call 911 if:   · Blood soaks through your bandage  · Your stitches come apart  · You feel lightheaded, short of breath, and have chest pain  · You cough up blood  · Your arm or leg feels warm, tender, and painful  It may look swollen and red  © 2014 [address] is for End User's use only and may not be sold, redistributed or otherwise used for commercial purposes  All illustrations and images included in CareNotes® are the copyrighted property of A D [employer] M [employer] Inc  or Erwin Benavides  The above information is an  only  It is not intended as medical advice for individual conditions or treatments  Talk to your doctor, nurse or pharmacist before following any medical regimen to see if it is safe and effective for you

## 2018-01-18 NOTE — DISCHARGE SUMMARY
CS Discharge Summary - Tammy Byers 33 y o  female MRN: [id number]    Unit/Bed#: LD PACU-01 Encounter: [id number]    Admission Date: 2018     Discharge Date: 2018    Admitting Diagnosis: 41 week gestation, prior CS failed TOLAC due to fetal intolerance to labor - category II tracing    Discharge Diagnosis: same    Procedures: repeat  section, low transverse incision    Attending: Dr Vikas Munoz DO    Hospital Course:     Tammy Byers is a 33 y o   who was admitted at 41 wks for IOL  Patient received a Owusu balloon for cervical ripening followed by Pitocin titration  Noted to have category 2 tracing recurrent lates with sporadic variables, decision was made for repeat  section due to fetal intolerance to labor  She underwent an uncomplicated  section   was transferred to  nursery  Patient tolerated the procedure well and was transferred to recovery in stable condition  Her post-operative course was uncomplicated  Preoperative hemaglobin was 10 9, postoperative was 8 6  She was subsequently started on PO iron  Her postoperative pain was well controlled with oral analgesics  On day of discharge, she was ambulating and able to reasonably perform all ADLs  She was voiding and had appropriate bowel function  Pain was well controlled  She was discharged home on post-operative day #3 without complications  Patient was instructed to follow up with her OB as an outpatient and was given appropriate warnings to call provider if she develops signs of infection or uncontrolled pain  Complications: None    Condition at discharge: good     Discharge instructions/Information to patient and family:   See after visit summary for information provided to patient and family  Provisions for Follow-Up Care:  See after visit summary for information related to follow-up care and any pertinent home health orders        Disposition: Home    Planned Readmission: No    Discharge Medications: For a complete list of the patient's medications, please refer to her med rec      Keven Nolan,

## 2018-01-18 NOTE — OP NOTE
OPERATIVE REPORT  PATIENT NAME: Birgit Rose    :  1985  MRN: [id number]  Pt Location: [facility] L&D OR ROOM 02    SURGERY DATE: 2018    Surgeon(s) and Role:     * Paul Macario DO - Primary     * Gadiel Estrada MD - Observing    Preop Diagnosis:  Fetal intolerance to labor, delivered, current hospitalization [O77 9]    Post-Op Diagnosis Codes:     * Fetal intolerance to labor, delivered, current hospitalization [O77 9]    Procedure(s) (LRB):   SECTION () (N/A)    Specimen(s):  ID Type Source Tests Collected by Time Destination   A :  Tissue (Placenta on Hold) OB Only Placenta PLACENTA IN STORAGE Paul Macario DO 2018 002    B :  Cord Blood Cord BLOOD GAS, VENOUS, CORD, BLOOD GAS, ARTERIAL, CORD Paul Macario DO 2018 0020        Estimated Blood Loss:   800    Drains:  Urethral Catheter Double-lumen 16 Fr  (Active)   Site Assessment Clean;Skin intact 2018 12:57 AM   Collection Container Standard drainage bag 2018 12:57 AM   Securement Method Securing device (Describe) 2018 12:57 AM   Output (mL) 350 mL 2018 11:27 PM   Number of days: 1       Anesthesia Type:   epidural    Operative Indications:  Fetal intolerance to labor, delivered, current hospitalization [O77 9]      Operative Findings:  Nl uterus, tubes, ovaries    Complications:   None    Procedure and Technique:   Section Procedure Note      Pre-operative Diagnosis: 41 week 1 day pregnancy  Previous C/S, desires TOLAC             nonreassuring FHT    Post-operative Diagnosis: same               Viable male                    Procedure(s) performed:  Repeat LTCS    Surgeon: Paul Macario DO      Assistant(s): Tl Perales MD    Findings:    Viable male weighing 7lbs 12oz; Apgar scores of 9 at one minute and 9 at five minutes     clear amniotic fluid  Normal uterus, tubes, and ovaries  Normal placenta with 3-vessel cord    Specimens: Arterial and venous cord gases, cord blood, segment of umbilical cord, placenta to routine storage     Estimated Blood Loss:  800 mL    Drains: valenzuela, blood-tinged           Complications:  None; patient tolerated the procedure well  Disposition: PACU            Condition: stable    Procedure Details     Reached 8cm dilatation  Unable to titrate pitocin due to recurring decels  Amnioinfusion limited benefit  Consented for R C/S  The patient was seen prior to the procedure  Risks, benefits, possible complications, alternate treatment options, and expected outcomes were discussed with the patient  The patient agreed with the proposed plan and gave informed consent  The patient was taken to [facility] Operating Room  She had already received appropriate epidural anesthesia  Fetal heart tones had been assessed and a Valenzuela catheter and SCDs were in place  Betadine vaginal prep was accomplished  The abdomen was prepped with Chloraprep and following appropriate drying time, the patient was draped in the usual sterile manner  A Time Out was held and the above information confirmed  The patient was identified as Ashly Vidal and the procedure verified as  Delivery  The patient had received Ancef and Azithro for prophylaxis  A Pfannenstiel incision was made and carried down through the underlying subcutaneous tissue to the fascia using a scalpel  Rectus fascia was then incised  We then proceeded in Francis-Ledesma fashion  All anatomic layers were well-demarcated  The rectus muscles were  and the peritoneum was identified, entered, and extended longitudinally with blunt dissection  The bladder blade was inserted  A low transverse uterine incision was made with the scalpel and extended laterally with blunt dissection  The amnion was entered bluntly  The fetal head was palpated and was noted to be deeply impacted in the pelvis  It was elevated, and delivered through the uterine incision followed by the body without difficulty    A nuchal cord X 1 was noted and reduced  Baby had spontaneous cry with good color and tone  Delayed cord clamping was employed  The umbilical cord was clamped and cut  The infant was handed off to the  providers  Pitocin infusion was begun  Arterial and venous cord gases, cord blood, and a segment of umbilical cord were obtained for evaluation  The placenta delivered spontaneously with uterine fundal massage and appeared normal  The uterus was exteriorized and curretted with a moist lap sponge  Uterus, tubes and ovaries appeared normal   Lower uterine segment was noted to be very thin  The uterine incision was closed with a running locked suture of 0 Vicryl  A second layer of the same suture was used to imbricate the first   Hemostasis was noted to be excellent  Normal saline solution was used to irrigate the posterior culdesac  The uterus was returned to the abdomen  The gutters were inspected and cleared of all clots and debris with irrigation and moist lap sponges  The fascia was closed with a running suture of 0 Vicryl  Subcutaneous tissues were closed with 0 Vicryl suture     The skin was closed with surgical steel staples  Sterile dressing was applied  An abdominal binder was then placed    ApH:7 238      BE:-6 3           CLZ:7 694        BE:-4 3    The patient appeared to tolerate the procedure very well  Lap sponge, needle, and instrument counts were correct x2  The patient was transferred to her postpartum recovery room in stable condition and her infant went to the  nursery  Attending Attestation: I was present for the entire procedure                   I was present for the entire procedure    Patient Disposition:  PACU     SIGNATURE: Mauricio Cummings DO  DATE: 2018  TIME: 1:06 AM

## 2018-01-18 NOTE — PROGRESS NOTES
OBSTETRIC CAT 2 PROGRESS NOTE  Vikas Reeder  [id number]  1/17/2018  10:03 PM      Trigger: recurrent variable decels, Pitocin discontinued    Participants: Prabha Jalloh DO, Layla Spears MD, Sanam Sauceda RN, Brissa Steiner Male RN    History: TOLAC IOL at 41wks    Assess: variables  Plan: Pitocin to remain off, IUPC placed, amnioinfusion, reassess in 30 minutes, discussed indications for C/S-pt understands and agrees with plan

## 2018-01-18 NOTE — SOCIAL WORK
Breast pump consult  Per pt request, pump ordered from [facility] via [address] for d/c this weekend  As discussed with pt, [facility] to call pt's room number to confirm order and delivery  Pt agreeable to same and denies any other CM needs  No other needs noted

## 2018-01-18 NOTE — PLAN OF CARE
BIRTH - VAGINAL/ SECTION     Fetal and maternal status remain reassuring during the birth process [de-identified]     Emotionally satisfying birthing experience for mother/fetus [address] Discharge to home or other facility with appropriate resources Progressing        INFECTION - ADULT     Absence or prevention of progression during hospitalization Progressing     Absence of fever/infection during neutropenic period Progressing        Knowledge Deficit     Patient/family/caregiver demonstrates understanding of disease process, treatment plan, medications, and discharge instructions Progressing     Verbalizes understanding of labor plan Progressing        Labor & Delivery     Manages discomfort Progressing     Patient vital signs are stable Progressing        PAIN - ADULT     Verbalizes/displays adequate comfort level or baseline comfort level Progressing        SAFETY ADULT     Patient will remain free of falls Progressing     Maintain or return to baseline ADL function Progressing     Maintain or return mobility status to optimal level Progressing

## 2018-01-18 NOTE — ANESTHESIA POSTPROCEDURE EVALUATION
Post-Op Assessment Note      CV Status:  Stable    Mental Status:  Alert and awake    Hydration Status:  Euvolemic    PONV Controlled:  Controlled    Airway Patency:  Patent    Post Op Vitals Reviewed: Yes          Staff: CRNA     Post-op block assessment: site cleaned        BP  142/95     Temp   98 6   Pulse  111   Resp   14   SpO2   96

## 2018-01-18 NOTE — MISCELLANEOUS
Message  Return to work or school:   Cierra Moe is under my professional care  She was seen in my office on 7/05/2017       Please excuse Abiodunpatrizia Innocent from work on 07/03/2017-07/05/2017  Signatures   Electronically signed by :  Rosenda Stewart, ; Jul 5 2017 11:18AM EST                       (Author)

## 2018-01-18 NOTE — OB LABOR/OXYTOCIN SAFETY PROGRESS
Oxytocin Safety Progress Check Note - Chasidy Stanley 33 y o  female MRN: [id number]    Unit/Bed#: -01 Encounter: [id number]    Obstetric History       T1      L1     SAB0   TAB0   Ectopic0   Multiple0   Live Births1    Obstetric Comments   Second trimester bleeding, new paternity, prior       Gestational Age: 41w1d  Dose (flavia-units/min) Oxytocin: 0 flavia-units/min  Contraction Frequency (minutes): 1 5-3 5  Contraction Quality: Moderate  Tachysystole: No   Dilation: 8        Effacement (%): 80  Station: -1  Baseline Rate: 145 bpm  Fetal Heart Rate: 143 BPM  FHR Category: Category II          Notes/comments:   Patient was noted to have recurrent variable decelerations, recovered with maternal repositioning, IV fluid bolus, Pitocin turned  IUPC was placed and amnioinfusion was given 500 cc of crystalloid fluid         performed in concert with Dr Love Gutiérrez MD 2018 9:56 PM

## 2018-01-19 LAB
BASOPHILS # BLD AUTO: 0.01 THOUSANDS/ΜL (ref 0–0.1)
BASOPHILS NFR BLD AUTO: 0 % (ref 0–1)
EOSINOPHIL # BLD AUTO: 0.2 THOUSAND/ΜL (ref 0–0.61)
EOSINOPHIL NFR BLD AUTO: 1 % (ref 0–6)
ERYTHROCYTE [DISTWIDTH] IN BLOOD BY AUTOMATED COUNT: 14 % (ref 11.6–15.1)
HCT VFR BLD AUTO: 26.8 % (ref 34.8–46.1)
HGB BLD-MCNC: 8.6 G/DL (ref 11.5–15.4)
LYMPHOCYTES # BLD AUTO: 1.89 THOUSANDS/ΜL (ref 0.6–4.47)
LYMPHOCYTES NFR BLD AUTO: 13 % (ref 14–44)
MCH RBC QN AUTO: 26 PG (ref 26.8–34.3)
MCHC RBC AUTO-ENTMCNC: 32.1 G/DL (ref 31.4–37.4)
MCV RBC AUTO: 81 FL (ref 82–98)
MONOCYTES # BLD AUTO: 0.96 THOUSAND/ΜL (ref 0.17–1.22)
MONOCYTES NFR BLD AUTO: 7 % (ref 4–12)
NEUTROPHILS # BLD AUTO: 11.5 THOUSANDS/ΜL (ref 1.85–7.62)
NEUTS SEG NFR BLD AUTO: 79 % (ref 43–75)
NRBC BLD AUTO-RTO: 0 /100 WBCS
PLATELET # BLD AUTO: 184 THOUSANDS/UL (ref 149–390)
PMV BLD AUTO: 10.4 FL (ref 8.9–12.7)
RBC # BLD AUTO: 3.31 MILLION/UL (ref 3.81–5.12)
WBC # BLD AUTO: 14.62 THOUSAND/UL (ref 4.31–10.16)

## 2018-01-19 PROCEDURE — 85025 COMPLETE CBC W/AUTO DIFF WBC: CPT | Performed by: OBSTETRICS & GYNECOLOGY

## 2018-01-19 RX ORDER — HYDROMORPHONE HYDROCHLORIDE 2 MG/1
2 TABLET ORAL EVERY 4 HOURS PRN
Status: DISCONTINUED | OUTPATIENT
Start: 2018-01-19 | End: 2018-01-21 | Stop reason: HOSPADM

## 2018-01-19 RX ORDER — HYDROMORPHONE HYDROCHLORIDE 2 MG/1
4 TABLET ORAL EVERY 4 HOURS PRN
Status: DISCONTINUED | OUTPATIENT
Start: 2018-01-19 | End: 2018-01-21 | Stop reason: HOSPADM

## 2018-01-19 RX ADMIN — Medication 1 TABLET: at 08:54

## 2018-01-19 RX ADMIN — DOCUSATE SODIUM 100 MG: 100 CAPSULE, LIQUID FILLED ORAL at 08:54

## 2018-01-19 RX ADMIN — OXYCODONE HYDROCHLORIDE AND ACETAMINOPHEN 2 TABLET: 5; 325 TABLET ORAL at 05:33

## 2018-01-19 RX ADMIN — IBUPROFEN 600 MG: 600 TABLET ORAL at 08:54

## 2018-01-19 RX ADMIN — OXYCODONE HYDROCHLORIDE AND ACETAMINOPHEN 2 TABLET: 5; 325 TABLET ORAL at 16:25

## 2018-01-19 RX ADMIN — OXYCODONE HYDROCHLORIDE AND ACETAMINOPHEN 2 TABLET: 5; 325 TABLET ORAL at 20:30

## 2018-01-19 RX ADMIN — OXYCODONE HYDROCHLORIDE AND ACETAMINOPHEN 2 TABLET: 5; 325 TABLET ORAL at 11:16

## 2018-01-19 NOTE — PROGRESS NOTES
Progress Note - OB/GYN   Esthela Caldwell 33 y o  female MRN: [id number]  Unit/Bed#: -01 Encounter: [id number]    Assessment:  PP/POD# 1 s/p Repeat low transverse  section, stable    Plan:  1  Postop day 1:  Routine postop care  2  Failed voiding trial x2 yesterday:  Patient was able to void  spontaneously today  Urine output is adequate  3  FEN:regular diet  4  Encourage early ambulation    Subjective:     Pain: yes  Tolerating PO: yes  Voiding: yes  Flatus: yes  BM: no  Ambulating: yes  Breastfeeding: Breastfeeding  Chest pain: no  Shortness of breath: no  Leg pain: no  Lochia: Minimal    Objective:     Vitals:   Vitals:    18 1600 18 2000 18 2350 18 0509   BP: 97/50 100/54 116/56 113/53   BP Location:       Pulse:  86 87 90   Resp:  18 16 16   Temp:  98 8 °F (37 1 °C) 98 4 °F (36 9 °C) 98 7 °F (37 1 °C)   TempSrc:  Oral Oral Oral   SpO2:  97% 97% 97%   Weight:       Height:           Physical Exam:     Physical Exam   Constitutional: She is oriented to person, place, and time  She appears well-developed and well-nourished  HENT:   Head: Normocephalic and atraumatic  Cardiovascular: Normal rate, regular rhythm and normal heart sounds  Pulmonary/Chest: Effort normal and breath sounds normal  No respiratory distress  She has no wheezes  Abdominal: Soft  She exhibits no distension  There is no tenderness  There is no rebound and no guarding  Incision C/D/I   Genitourinary: Vagina normal and uterus normal    Musculoskeletal: Normal range of motion  Neurological: She is alert and oriented to person, place, and time  Psychiatric: She has a normal mood and affect  Her behavior is normal    Nursing note and vitals reviewed  Uterine fundus firm and non-tender, -1 cm below the umbilicus       Lab, Imaging and other studies: I have personally reviewed pertinent reports        Lab Results   Component Value Date    WBC 14 62 (H) 2018    HGB 8 6 (L) 2018    HCT 26 8 (L) 01/19/2018    MCV 81 (L) 01/19/2018     01/19/2018         Edra Nailer  01/19/18

## 2018-01-19 NOTE — LACTATION NOTE
This note was copied from a baby's chart  CONSULT - LACTATION  Baby Boy Roxanne Heredia 1 days male MRN: [id number]    [address] [facility] Room / Bed: (N)/(N) Encounter: [id number]    Maternal Information     MOTHER:  Sofia Herrera  Maternal Age: 33 y o    OB History: #: 1, Date: 08/28/10, Sex: Male, Weight: 3685 g (8 lb 2 oz), GA: 41w0d, Delivery: , Unspecified, Apgar1: None, Apgar5: None, Living: Living, Birth Comments: None    #: 2, Date: 18, Sex: Male, Weight: 3530 g (7 lb 12 5 oz), GA: 41w2d, Delivery: , Low Transverse, Apgar1: 9, Apgar5: 9, Living: Living, Birth Comments: None   Previouse breast reduction surgery? No    Lactation history:   Has patient previously breast fed: Yes   How long had patient previously breast fed: 2 months   Previous breast feeding complications: Low milk supply     Past Surgical History:   Procedure Laterality Date     SECTION      CYST REMOVAL      left breast, benign    AK  DELIVERY ONLY N/A 2018    Procedure:  SECTION (); Surgeon: Floresita Motta DO;  Location: [facility] ;  Service: Obstetrics       Birth information:  YOB: 2017   Time of birth: 12:19 AM   Sex: male   Delivery type: , Low Transverse   Birth Weight: 3530 g (7 lb 12 5 oz)   Percent of Weight Change: -4%     Gestational Age: 38w3d   [unfilled]    Assessment     Breast and nipple assessment: normal assessment    [place] Assessment: not assessed at this time  Feeding assessment: not assessed at this time  Feeding recommendations:  breast feed on demand     Discussed 2nd night syndrome and ways to calm infant  Hand out given  Information on hand expression given  Discussed benefits of knowing how to manually express breast including stimulating milk supply, softening nipple for latch and evacuating breast in the event of engorgement  Met with mother   Provided mother with Ready, Set, Baby booklet  Discussed Skin to Skin contact an benefits to mom and baby  Talked about the delay of the first bath until baby has adjusted  Spoke about the benefits of rooming in  Feeding on cue and what that means for recognizing infant's hunger  Avoidance of pacifiers for the first month discussed  Talked about exclusive breastfeeding for the first 6 months  Positioning and latch reviewed as well as showing images of other feeding positions  Discussed the properties of a good latch in any position  Reviewed hand/manual expression  Discussed s/s that baby is getting enough milk and some s/s that breastfeeding dyad may need further help  Gave information on common concerns, what to expect the first few weeks after delivery, preparing for other caregivers, and how partners can help  Resources for support also provided  Discussed s/s engorgement and how to manage with medications and cool compresses as well as s/s mastitis and when to contact physician  Discussed risks for early supplementation: over feeding, longer digestion times, engorgement for mom, lower milk supply for mom, and nipple confusion  Benefits of breast feeding for infant's intestinal tract, less engorgement for mom, protection from multiple disease processes as infant develops, avoidance of over feeding for infant, less nipple confusion, and increased health benefits for mom  Encouraged MOB to call for assistance, questions, and concerns about breastfeeding  Extension provided    Trudy Calle RN 1/19/2018 3:54 PM

## 2018-01-20 RX ORDER — OXYCODONE HYDROCHLORIDE AND ACETAMINOPHEN 5; 325 MG/1; MG/1
2 TABLET ORAL EVERY 4 HOURS PRN
Status: DISCONTINUED | OUTPATIENT
Start: 2018-01-20 | End: 2018-01-21 | Stop reason: HOSPADM

## 2018-01-20 RX ORDER — FERROUS SULFATE 325(65) MG
325 TABLET ORAL 2 TIMES DAILY WITH MEALS
Status: DISCONTINUED | OUTPATIENT
Start: 2018-01-20 | End: 2018-01-21 | Stop reason: HOSPADM

## 2018-01-20 RX ORDER — OXYCODONE HYDROCHLORIDE AND ACETAMINOPHEN 5; 325 MG/1; MG/1
1 TABLET ORAL EVERY 4 HOURS PRN
Status: DISCONTINUED | OUTPATIENT
Start: 2018-01-20 | End: 2018-01-21 | Stop reason: HOSPADM

## 2018-01-20 RX ADMIN — OXYCODONE HYDROCHLORIDE AND ACETAMINOPHEN 2 TABLET: 5; 325 TABLET ORAL at 22:37

## 2018-01-20 RX ADMIN — OXYCODONE HYDROCHLORIDE AND ACETAMINOPHEN 2 TABLET: 5; 325 TABLET ORAL at 03:51

## 2018-01-20 RX ADMIN — OXYCODONE HYDROCHLORIDE AND ACETAMINOPHEN 2 TABLET: 5; 325 TABLET ORAL at 18:22

## 2018-01-20 RX ADMIN — HYDROMORPHONE HYDROCHLORIDE 4 MG: 2 TABLET ORAL at 00:10

## 2018-01-20 RX ADMIN — OXYCODONE HYDROCHLORIDE AND ACETAMINOPHEN 2 TABLET: 5; 325 TABLET ORAL at 13:26

## 2018-01-20 RX ADMIN — DOCUSATE SODIUM 100 MG: 100 CAPSULE, LIQUID FILLED ORAL at 08:50

## 2018-01-20 RX ADMIN — OXYCODONE HYDROCHLORIDE AND ACETAMINOPHEN 2 TABLET: 5; 325 TABLET ORAL at 08:50

## 2018-01-20 RX ADMIN — Medication 325 MG: at 16:30

## 2018-01-20 RX ADMIN — Medication 1 TABLET: at 08:50

## 2018-01-20 NOTE — PROGRESS NOTES
Progress Note - OB/GYN   Neal Boogie 33 y o  female MRN: [id number]  Unit/Bed#: -01 Encounter: [id number]    Assessment:  Postop Day #2 s/p RLTCS, stable  Passed voiding trial      Plan:  1) Acute blood loss anemia   Hg 10 9 --> 8 6   VSS, asymptomatic   Start 325mg BID ferrous sulfate  2) Continue routine post partum/post op care   Encourage ambulation   Encourage breastfeeding   Anticipate discharge tomorrow, 1/21/18    Subjective/Objective   Chief Complaint:     Post delivery  Patient is feeling well  She reports some cramping with breastfeeding  Lochia WNL  Subjective:     Pain: yes, incisional, improved with meds  Tolerating PO: yes  Voiding: yes  Flatus: yes  BM: no  Ambulating: yes  Breastfeeding:  yes  Chest pain: no  Shortness of breath: no  Leg pain: no  Lochia: minimal    Objective:     Vitals: /64   Pulse 88   Temp 98 2 °F (36 8 °C) (Oral)   Resp 20   Ht 5' 6" (1 676 m)   Wt 60 3 kg (133 lb)   LMP 04/04/2017   SpO2 99%   Breastfeeding?  Yes   BMI 21 47 kg/m²       Intake/Output Summary (Last 24 hours) at 01/20/18 0956  Last data filed at 01/19/18 1046   Gross per 24 hour   Intake                0 ml   Output              650 ml   Net             -650 ml       Physical Exam:     AAOx3, NAD  CV, RRR  CTA b/l, no WRR  Soft, non-tender, non-distended, no rebound or guarding, no CVA tenderness  Uterine fundus firm and non-tender, -1 cm below the umbilicus   Incision clean/dry/intact without signs of inflammation   staples: clean, dry, and intact  Non tender      Lab Results   Component Value Date    WBC 14 62 (H) 01/19/2018    HGB 8 6 (L) 01/19/2018    HCT 26 8 (L) 01/19/2018    MCV 81 (L) 01/19/2018     01/19/2018                         Olvin Roche MD  1/20/2018  9:56 AM

## 2018-01-20 NOTE — LACTATION NOTE
This note was copied from a baby's chart  Mom concerned about cluster feedings  Reassured normal  Reviewed signs of milk transfer

## 2018-01-21 VITALS
DIASTOLIC BLOOD PRESSURE: 73 MMHG | TEMPERATURE: 98.1 F | SYSTOLIC BLOOD PRESSURE: 127 MMHG | HEIGHT: 66 IN | RESPIRATION RATE: 20 BRPM | OXYGEN SATURATION: 99 % | HEART RATE: 102 BPM | WEIGHT: 133 LBS | BODY MASS INDEX: 21.38 KG/M2

## 2018-01-21 PROCEDURE — 90707 MMR VACCINE SC: CPT | Performed by: OBSTETRICS & GYNECOLOGY

## 2018-01-21 RX ORDER — OXYCODONE HYDROCHLORIDE AND ACETAMINOPHEN 5; 325 MG/1; MG/1
1 TABLET ORAL EVERY 4 HOURS PRN
Qty: 30 TABLET | Refills: 0 | Status: SHIPPED | OUTPATIENT
Start: 2018-01-21 | End: 2018-01-26 | Stop reason: SDUPTHER

## 2018-01-21 RX ADMIN — Medication 325 MG: at 08:20

## 2018-01-21 RX ADMIN — OXYCODONE HYDROCHLORIDE AND ACETAMINOPHEN 2 TABLET: 5; 325 TABLET ORAL at 08:20

## 2018-01-21 RX ADMIN — DOCUSATE SODIUM 100 MG: 100 CAPSULE, LIQUID FILLED ORAL at 08:20

## 2018-01-21 RX ADMIN — MEASLES, MUMPS, AND RUBELLA VIRUS VACCINE LIVE 0.5 ML: 1000; 12500; 1000 INJECTION, POWDER, LYOPHILIZED, FOR SUSPENSION SUBCUTANEOUS at 12:20

## 2018-01-21 RX ADMIN — OXYCODONE HYDROCHLORIDE AND ACETAMINOPHEN 2 TABLET: 5; 325 TABLET ORAL at 12:51

## 2018-01-21 RX ADMIN — Medication 1 TABLET: at 08:20

## 2018-01-21 NOTE — PROGRESS NOTES
Progress Note - OB/GYN   Garima Carrero 33 y o  female MRN: [id number]  Unit/Bed#: -01 Encounter: [id number]    Assessment:  POD#3 s/p Repeat low transverse  section, stable    Plan:  1  Acute blood loss anemia: Hgb 10 9->8 6, on iron, asymptomatic, VSS  2  Continue routine postoperative care  3  Encourage ambulation  4  Encourage breastfeeding  5  Pain control as needed    Disposition: stable, anticipate discharge today    Subjective/Objective   Chief Complaint:     POD#3 s/p Repeat low transverse  section    Subjective:     Pain: incisional, improved with pain medication  Tolerating PO: yes  Voiding: yes  Flatus: yes  BM: no  Ambulating: yes  Breastfeeding: Breastfeeding  Chest pain: no  Shortness of breath: no  Leg pain: no  Lochia: minimal    Objective:     Vitals:  Vitals:    18 0024 18 0846 18 1548 18 2342   BP: 107/51 112/64 106/66 111/57   BP Location: Right arm  Right arm    Pulse: 77 88 102 88   Resp:    Temp: 98 3 °F (36 8 °C) 98 2 °F (36 8 °C) 98 5 °F (36 9 °C) 98 3 °F (36 8 °C)   TempSrc: Oral Oral Oral Oral   SpO2:       Weight:       Height:           Physical Exam:     Physical Exam   Constitutional: She is oriented to person, place, and time  She appears well-developed and well-nourished  Cardiovascular: Normal rate, regular rhythm and normal heart sounds  Pulmonary/Chest: Effort normal and breath sounds normal    Abdominal: Soft  Bowel sounds are normal    Neurological: She is alert and oriented to person, place, and time  Uterine fundus firm and non-tender, -1 cm below the umbilicus       Lab, Imaging and other studies: I have personally reviewed pertinent reports        Lab Results   Component Value Date    WBC 14 62 (H) 2018    HGB 8 6 (L) 2018    HCT 26 8 (L) 2018    MCV 81 (L) 2018     2018               Nilesh Nieves DO  18

## 2018-01-21 NOTE — PROGRESS NOTES
Patient discharged to home with family  All discharge instructions were reviewed with the patient and an opportunity for questions was provided  The patient left the floor in stable condition walking with family at her side

## 2018-01-21 NOTE — LACTATION NOTE
This note was copied from a baby's chart  Mom state infant is feeding well  States infant has 10% weight loss so she is to supplement per pediatrician  Is to continue to breastfeed and supplement via bottle nipple after  Set up to start pumping  Encouraged to stop supplements once milk is in  Given discharge breastfeeding pkt and use of feeding log reviewed  Discussed engorgement relief measures and where to call for additional assistance as needed

## 2018-01-22 VITALS
BODY MASS INDEX: 20.24 KG/M2 | DIASTOLIC BLOOD PRESSURE: 70 MMHG | HEIGHT: 62 IN | WEIGHT: 110 LBS | SYSTOLIC BLOOD PRESSURE: 100 MMHG

## 2018-01-22 VITALS
WEIGHT: 125.03 LBS | BODY MASS INDEX: 20.83 KG/M2 | DIASTOLIC BLOOD PRESSURE: 64 MMHG | HEIGHT: 65 IN | SYSTOLIC BLOOD PRESSURE: 103 MMHG

## 2018-01-22 VITALS — DIASTOLIC BLOOD PRESSURE: 56 MMHG | SYSTOLIC BLOOD PRESSURE: 112 MMHG | WEIGHT: 118 LBS | BODY MASS INDEX: 21.58 KG/M2

## 2018-01-22 VITALS
BODY MASS INDEX: 22.52 KG/M2 | WEIGHT: 122.4 LBS | SYSTOLIC BLOOD PRESSURE: 110 MMHG | HEIGHT: 62 IN | DIASTOLIC BLOOD PRESSURE: 72 MMHG

## 2018-01-22 VITALS
SYSTOLIC BLOOD PRESSURE: 106 MMHG | WEIGHT: 108 LBS | HEIGHT: 62 IN | BODY MASS INDEX: 19.88 KG/M2 | DIASTOLIC BLOOD PRESSURE: 62 MMHG

## 2018-01-22 VITALS
SYSTOLIC BLOOD PRESSURE: 102 MMHG | BODY MASS INDEX: 20.69 KG/M2 | WEIGHT: 113.13 LBS | DIASTOLIC BLOOD PRESSURE: 54 MMHG

## 2018-01-22 VITALS
SYSTOLIC BLOOD PRESSURE: 104 MMHG | WEIGHT: 126.6 LBS | DIASTOLIC BLOOD PRESSURE: 64 MMHG | HEIGHT: 65 IN | BODY MASS INDEX: 21.09 KG/M2 | RESPIRATION RATE: 14 BRPM

## 2018-01-22 VITALS
HEIGHT: 62 IN | SYSTOLIC BLOOD PRESSURE: 96 MMHG | WEIGHT: 107 LBS | BODY MASS INDEX: 19.69 KG/M2 | DIASTOLIC BLOOD PRESSURE: 62 MMHG

## 2018-01-22 VITALS
HEIGHT: 65 IN | SYSTOLIC BLOOD PRESSURE: 110 MMHG | BODY MASS INDEX: 21.39 KG/M2 | WEIGHT: 128.4 LBS | DIASTOLIC BLOOD PRESSURE: 68 MMHG

## 2018-01-22 VITALS
DIASTOLIC BLOOD PRESSURE: 68 MMHG | HEIGHT: 65 IN | WEIGHT: 129 LBS | BODY MASS INDEX: 21.49 KG/M2 | SYSTOLIC BLOOD PRESSURE: 118 MMHG

## 2018-01-24 VITALS
BODY MASS INDEX: 22.13 KG/M2 | HEIGHT: 65 IN | SYSTOLIC BLOOD PRESSURE: 100 MMHG | WEIGHT: 132.8 LBS | DIASTOLIC BLOOD PRESSURE: 74 MMHG

## 2018-01-24 VITALS
BODY MASS INDEX: 22.26 KG/M2 | WEIGHT: 133.6 LBS | SYSTOLIC BLOOD PRESSURE: 100 MMHG | HEIGHT: 65 IN | DIASTOLIC BLOOD PRESSURE: 68 MMHG

## 2018-01-24 VITALS
HEIGHT: 65 IN | SYSTOLIC BLOOD PRESSURE: 116 MMHG | WEIGHT: 134 LBS | DIASTOLIC BLOOD PRESSURE: 70 MMHG | BODY MASS INDEX: 22.33 KG/M2

## 2018-01-24 VITALS
HEIGHT: 65 IN | DIASTOLIC BLOOD PRESSURE: 68 MMHG | WEIGHT: 135.2 LBS | BODY MASS INDEX: 22.53 KG/M2 | SYSTOLIC BLOOD PRESSURE: 120 MMHG

## 2018-01-24 VITALS
DIASTOLIC BLOOD PRESSURE: 66 MMHG | HEIGHT: 65 IN | WEIGHT: 133.4 LBS | BODY MASS INDEX: 22.23 KG/M2 | SYSTOLIC BLOOD PRESSURE: 120 MMHG

## 2018-01-24 NOTE — CASE MANAGEMENT
Notification of Birth and  Information  This is a Notification of birth and  information to our facility [facility]  Please be advised that this patient is currently in our facility under Inpatient Status  Below you will find the Birth/ Summary, Attending Physician and Facilitys information including NPI# and contact for the Utilization  assigned to the [facility] where the patient is receiving services  Please feel free to contact the Utilization Review Department with any questions  Mothers Information:  Ankit Ashby  MRN: [id number]  YOB: 1985  Admission Date: 2018  9:25 AM  Discharge Date: 2018  2:05 PM  Disposition: Home/Self Care  Admitting Diagnosis: Encounter for  delivery without indication [O82]   Information:  Estimated Date of Delivery: 18  Information for the patient's :  Janice Martino [[id number]]     [place] Delivery Information:  Sex: male  Delivered 2018 12:19 AM by , Low Transverse; Gestational Age: 38w3d     Measurements:  Weight: 7 lb 12 5 oz (3530 g); Height: 19 5"    APGAR 1 minute 5 minutes 10 minutes   Totals: 9 9      OB History      Para Term  AB Living    2 2 2     2    SAB TAB Ectopic Multiple Live Births          0 2        Obstetric Comments    Second trimester bleeding, new paternity, prior          Attending Physician:  DEYANIRA Gallardo    Specialty- Obstetrics and Gynecology  [facility] ID- [id number]  [address]  Phone 1: [phone]  Fax: [phone]    [facility]  [address] [facility], [address] [place] [address]  [phone]  Tax ID: [id number]  NPI: [id number]    [address] [facility] in the [facility] by Erwin Benavides for 2017  Network Utilization Review Department  Phone: [phone]; Fax [phone]  ATTENTION: The Network Utilization Review Department is now centralized for our 7 Facilities  Make a note that we have a new phone and fax numbers for our Department  Please call with any questions or concerns to [phone] and carefully follow the prompts so that you are directed to the right person  All voicemails are confidential  Fax any determinations, approvals, denials, and requests for initial or continue stay review clinical to [phone]  Due to HIGH CALL volume, it would be easier if you could please send faxed requests to expedite your requests and in part, help us provide discharge notifications faster

## 2018-01-26 ENCOUNTER — OFFICE VISIT (OUTPATIENT)
Dept: OBGYN CLINIC | Facility: MEDICAL CENTER | Age: 33
End: 2018-01-26

## 2018-01-26 VITALS — SYSTOLIC BLOOD PRESSURE: 114 MMHG | DIASTOLIC BLOOD PRESSURE: 62 MMHG | WEIGHT: 127 LBS | BODY MASS INDEX: 20.5 KG/M2

## 2018-01-26 PROBLEM — O09.899 RUBELLA NON-IMMUNE STATUS, ANTEPARTUM: Status: RESOLVED | Noted: 2017-10-20 | Resolved: 2018-01-26

## 2018-01-26 PROBLEM — Z28.39 RUBELLA NON-IMMUNE STATUS, ANTEPARTUM: Status: RESOLVED | Noted: 2017-10-20 | Resolved: 2018-01-26

## 2018-01-26 PROBLEM — O48.0 41 WEEKS GESTATION OF PREGNANCY: Status: RESOLVED | Noted: 2018-01-17 | Resolved: 2018-01-26

## 2018-01-26 PROBLEM — Z3A.41 41 WEEKS GESTATION OF PREGNANCY: Status: RESOLVED | Noted: 2018-01-17 | Resolved: 2018-01-26

## 2018-01-26 LAB — PLACENTA IN STORAGE: NORMAL

## 2018-01-26 PROCEDURE — 99024 POSTOP FOLLOW-UP VISIT: CPT | Performed by: OBSTETRICS & GYNECOLOGY

## 2018-01-26 RX ORDER — OXYCODONE HYDROCHLORIDE AND ACETAMINOPHEN 5; 325 MG/1; MG/1
1 TABLET ORAL EVERY 4 HOURS PRN
Qty: 20 TABLET | Refills: 0 | Status: SHIPPED | OUTPATIENT
Start: 2018-01-26 | End: 2018-02-05

## 2018-01-26 NOTE — PROGRESS NOTES
Patient returns status post repeat  section  She is doing well she has resume driving and has only minimal incisional discomfort  Lochia is slowing  She is breast-feeding without difficulty  Baby has good sleep and wake patterns  Incision is clean, dry and well approximated  Benzoin and Steri-Strips are in place  Patient was cautioned to limit heavy lifting  She may begin to increase her activity  Additional prescription was given for Percocet as she is almost out of her discharge prescription  Discussion was had regarding contraceptive options patient is uncertain at this time  To return to the office in 5 weeks for final postpartum check

## 2018-01-26 NOTE — LETTER
chris was here today for an appointment 1/16/2018 to have her visit with the delivering doctor  Any questions call [phone]

## 2018-03-07 NOTE — PROGRESS NOTES
Education  Baby & Me Education 1st Trimester:   First Trimester Education provided:   benefits of breastfeeding, importance of exclusive breastfeeding and early initiation of breastfeeding   The patient is planning on breastfeeding  Individualized education given: Baby & Me freya info given  Prenatal education provided by: Yahaira iPnto MA      Signatures   Electronically signed by :  Nestor Armando, ; Jun 14 2017  4:58PM EST                       (Co-author)    Electronically signed by : Hetal Sierra MD; Jun 14 2017  5:46PM EST

## 2018-03-12 ENCOUNTER — TELEPHONE (OUTPATIENT)
Dept: POSTPARTUM | Facility: CLINIC | Age: 33
End: 2018-03-12

## 2018-03-12 NOTE — TELEPHONE ENCOUNTER
I attempted to reach Daya Lizarraga about her missed appointment today  I was unable to LM as her voicemail box is full

## 2018-03-15 ENCOUNTER — APPOINTMENT (OUTPATIENT)
Dept: LAB | Facility: MEDICAL CENTER | Age: 33
End: 2018-03-15
Payer: COMMERCIAL

## 2018-03-15 ENCOUNTER — OFFICE VISIT (OUTPATIENT)
Dept: OBGYN CLINIC | Facility: MEDICAL CENTER | Age: 33
End: 2018-03-15

## 2018-03-15 VITALS
BODY MASS INDEX: 19.29 KG/M2 | DIASTOLIC BLOOD PRESSURE: 66 MMHG | WEIGHT: 120 LBS | HEIGHT: 66 IN | SYSTOLIC BLOOD PRESSURE: 98 MMHG | RESPIRATION RATE: 14 BRPM

## 2018-03-15 DIAGNOSIS — R63.0 POOR APPETITE: Primary | ICD-10-CM

## 2018-03-15 DIAGNOSIS — R63.0 POOR APPETITE: ICD-10-CM

## 2018-03-15 LAB — TSH SERPL DL<=0.05 MIU/L-ACNC: 2.19 UIU/ML (ref 0.36–3.74)

## 2018-03-15 PROCEDURE — 99024 POSTOP FOLLOW-UP VISIT: CPT | Performed by: OBSTETRICS & GYNECOLOGY

## 2018-03-15 PROCEDURE — 36415 COLL VENOUS BLD VENIPUNCTURE: CPT

## 2018-03-15 PROCEDURE — 84443 ASSAY THYROID STIM HORMONE: CPT

## 2018-03-30 ENCOUNTER — TELEPHONE (OUTPATIENT)
Dept: OBGYN CLINIC | Facility: CLINIC | Age: 33
End: 2018-03-30

## 2018-03-30 ENCOUNTER — OFFICE VISIT (OUTPATIENT)
Dept: POSTPARTUM | Facility: CLINIC | Age: 33
End: 2018-03-30

## 2018-03-30 DIAGNOSIS — Z71.89 ENCOUNTER FOR BREAST FEEDING COUNSELING: Primary | ICD-10-CM

## 2018-03-30 DIAGNOSIS — O92.70 LACTATION PROBLEM: ICD-10-CM

## 2018-03-30 NOTE — TELEPHONE ENCOUNTER
----- Message from Elyse Sandifer, MD sent at 3/16/2018  6:24 AM EDT -----  Please notify patient her TSH was normal

## 2018-03-30 NOTE — PATIENT INSTRUCTIONS
Plan for breastfeeding    Reassurance and support given  Increase frequency of expression  Hands on pumping several times a day can help increase your supply  Manual expression demonstrated  Hand expression can help increase your supply  Hand out and demonstration given  Massage  Hand out given  Supplementation recommended (document method-education if necessary)  expressed breast milk or formula via paced bottle feeding as needed  Use of pump demonstrated  Cycle your pump through stimulation and expression mode several times during a pumping session  Use massage and breast compression to help express more milk  Consider investing in a pump that has faster cycling speeds or higher possible pump pressures  Return for evaluation with Dr Bri Guaman regarding decreased milk supply  Hand out given regarding increasing supply including information about galactagogues  No particular supplement recommended

## 2018-03-30 NOTE — PROGRESS NOTES
BREAST FEEDING FOLLOW UP VISIT    Informant/Relationship: Samreen Mahoney    Discussion of General Lactation Issues: Samreen Mahoney is still struggling with low supply even after increasing pumping and feedings at the breast   In fact, she feels her supply is going down  Her OB recommended that she speak with us regarding supplements or medications that can increase her supply  Infant is 4months old today  Interval Breastfeeding History:    Frequency of breast feeding: Every 1-2 hours during the day and twice overnight (goes 8 hours without emptying her breasts overnight  Does mother feel breastfeeding is effective: Yes  Does infant appear satisfied after nursing:Yes  Stooling pattern normal:Yes  Urinating frequently:Yes  Using shield or shells:No    Alternative/Artificial Feedings:   Bottle: Yes, twice a day  Cup: No  Syringe/Finger: No           Formula Type: Similac Advance                     Amount: 4 ounces twice a day            Breast Milk:                      Amount: none            Frequency Q 1-4 Hr between feedings  Elimination Problems: No      Equipment:  Nipple Shield             Type: none             Size: n/a             Frequency of Use: n/a  Pump            Type: Megna            Frequency of Use: several times a day  Does not express more than drops  Shells            Type: none            Frequency of use: n/a    Equipment Problems: no      Mom:  Breast: Normal  Nipple Assessment in General: Normal: elongated/eraser, no discoloration and no damage noted  Mother's Awareness of Feeding Cues                 Recognizes: Yes                  Verbalizes: Yes  Support System: ISIDRA ROGERS  History of Breastfeeding:  prior child for 3 months  Changes/Stressors/Violence: -DV  Concerns/Goals: Samreen Mahoney would like to breast feed for at least 6 months    Problems with Mom: concerns about low supply    Physical Exam   Constitutional: She is oriented to person, place, and time   She appears well-developed and well-nourished  Neck: Normal range of motion  Pulmonary/Chest: Effort normal    Musculoskeletal: Normal range of motion  Neurological: She is alert and oriented to person, place, and time  Skin: Skin is warm and dry  Psychiatric: She has a normal mood and affect  Her behavior is normal  Judgment and thought content normal        Infant:  Behaviors: Alert  Color: Pink  Birth weight: 3530gm  Current weight: 5550gm    Problems with infant: none      General Appearance:  Alert, active, no distress                             Head:  Normocephalic                             Eyes:  Conjunctiva clear, no drainage                              Ears:  Normally placed, no anomolies                             Nose:  no drainage or erythema                           Mouth:  No lesions                    Neck:  Supple, symmetrical, trachea midline                 Respiratory:  No grunting, flaring, retractions, breath sounds clear and equal   Nasal congestion            Cardiovascular:  Regular rate and rhythm  No murmur  Adequate perfusion/capillary refill  Abdomen:   Soft, non-tender             Genitourinary:  Normal male, testes descended, no discharge, swelling, or pain                          Spine:   No abnormalities noted        Musculoskeletal:  Full range of motion          Skin/Hair/Nails:   Skin warm, dry, and intact, no rashes or abnormal dyspigmentation or lesions                Neurologic:   No abnormal movement, tone appropriate     Latch:  Efficiency:               Lips Flanged: Yes, occasionally curled under              Depth of latch: fair              Audible Swallow: YesYes              Visible Milk: Yes              Wide Open/ Asymmetrical: Yes              Suck Swallow Cycle: Breathing: unlabored, Coordinated: yes  Nipple Assessment after latch: Normal: elongated/eraser, no discoloration and no damage noted  Latch Problems: Francisco essentially self latches and nurses  After a few minutes he becomes agitated and comes on and off the breast very frequently  Matt Walker switched from one breast to the other several times to feed him  He transferred 15gm of milk after a 30 minute feeding  He was supplemented with formula via paced bottle feeding  Position:  Infant's Ergonomics/Body               Body Alignment: Yes               Head Supported: Yes               Close to Mom's body/ Lifted/ Supported: Yes               Mom's Ergonomics/Body: Yes                           Supported: Yes                           Sitting Back: Yes                           Brings Baby to her breast: Yes  Positioning Problems: None    Pumping: We worked with [employer] pump  The flange size allowed for most of her breast to be pulled into the tunnel (too large)  She does not have the inserts that make the flange smaller that were listed in the owner's manual   The pumps' maximum speed is 45 cycles per minute  Pump pressures are listed as L1-L9 but no specific pressures listed  Matt Walker then did some hand expression and was able to return my demonstration  Handouts: Increasing your supply    Education:  Reviewed Frequency/Supply & Demand: Continue to offer the breast on demand  Try breast feeding all night rather than giving one formula feeding  Continue to pump to try to increase supply  Reviewed Alternative/Artificial Feedings: Paced bottle feeding when needed  Reviewed Equipment: Medela Pump in Style  Use your pumps cycles to try to stimulate more milk production  Hands on pumping can help you get more milk out  Plan for breastfeeding    Reassurance and support given  Increase frequency of expression  Hands on pumping several times a day can help increase your supply  Manual expression demonstrated  Hand expression can help increase your supply  Hand out and demonstration given  Massage  Hand out given  Supplementation recommended (document method-education if necessary)  expressed breast milk or formula via paced bottle feeding as needed  Use of pump demonstrated  Cycle your pump through stimulation and expression mode several times during a pumping session  Use massage and breast compression to help express more milk  Return for evaluation with Dr Hendrick Goltz regarding decreased milk supply  Hand out given regarding increasing supply including information about galactagogues  No particular supplement recommended

## 2018-04-07 NOTE — PROGRESS NOTES
I have reviewed the notes, assessments, and/or procedures performed by Cesar Levin RN, [address] [place] [address], I concur with her/his documentation of Derrek Ibarra

## 2018-04-13 ENCOUNTER — DOCUMENTATION (OUTPATIENT)
Dept: OBGYN CLINIC | Facility: CLINIC | Age: 33
End: 2018-04-13

## 2018-04-27 NOTE — TELEPHONE ENCOUNTER
I try calling the patient to give her results for her TSH  In which her voice mail was full   I mailed out a card with her results on 03/30/2018

## 2018-05-23 ENCOUNTER — OFFICE VISIT (OUTPATIENT)
Dept: FAMILY MEDICINE CLINIC | Facility: MEDICAL CENTER | Age: 33
End: 2018-05-23
Payer: COMMERCIAL

## 2018-05-23 VITALS
BODY MASS INDEX: 17.94 KG/M2 | HEART RATE: 72 BPM | WEIGHT: 111.6 LBS | HEIGHT: 66 IN | SYSTOLIC BLOOD PRESSURE: 102 MMHG | RESPIRATION RATE: 16 BRPM | DIASTOLIC BLOOD PRESSURE: 60 MMHG

## 2018-05-23 DIAGNOSIS — Z13.220 SCREENING FOR LIPID DISORDERS: ICD-10-CM

## 2018-05-23 DIAGNOSIS — Z13.29 SCREENING FOR THYROID DISORDER: ICD-10-CM

## 2018-05-23 DIAGNOSIS — Z00.00 PHYSICAL EXAM, ANNUAL: Primary | ICD-10-CM

## 2018-05-23 DIAGNOSIS — Z13.1 SCREENING FOR DIABETES MELLITUS: ICD-10-CM

## 2018-05-23 DIAGNOSIS — Z80.0 FAMILY HX OF COLON CANCER: ICD-10-CM

## 2018-05-23 PROBLEM — N63.20 LEFT BREAST LUMP: Status: ACTIVE | Noted: 2017-07-05

## 2018-05-23 PROCEDURE — 99385 PREV VISIT NEW AGE 18-39: CPT | Performed by: FAMILY MEDICINE

## 2018-05-23 NOTE — PROGRESS NOTES
Assessment/Plan:    No problem-specific Assessment & Plan notes found for this encounter  Diagnoses and all orders for this visit:    Physical exam, annual    Screening for lipid disorders  -     Lipid Panel with Direct LDL reflex; Future    Screening for diabetes mellitus  -     Comprehensive metabolic panel; Future    Screening for thyroid disorder  -     TSH, 3rd generation with T4 reflex; Future    Family hx of colon cancer  -     Ambulatory referral to Gastroenterology; Future    Patient appears to be doing well overall  Continuation of healthy lifestyle encouraged  She has a strong family history of heart disease and thyroid disease so I will check her lipid levels and thyroid levels  Will get a CMP as well  She has a family history of colon cancer in her father who was diagnosed in his 35s  I highly encouraged patient to get a colonoscopy as she is at high risk  She was agreeable and a referral was placed  Follow-up in one year sooner if needed  Subjective:      Patient ID: Darren Ho is a 33 y o  female  Patient presents for physical exam   She does have a gynecologist   She is a nonsmoker  Occasional alcohol use  Does not use drugs  No acute concerns  The following portions of the patient's history were reviewed and updated as appropriate: allergies, current medications, past family history, past medical history, past social history, past surgical history and problem list     Review of Systems   Constitutional: Negative for fever  Respiratory: Negative for shortness of breath  Cardiovascular: Negative for chest pain  Gastrointestinal: Negative for abdominal pain and blood in stool  Genitourinary: Negative for dysuria  Musculoskeletal: Negative for arthralgias and myalgias  Skin: Negative for rash  Neurological: Negative for headaches           Objective:      /60 (BP Location: Left arm, Patient Position: Sitting, Cuff Size: Adult)   Pulse 72   Resp 16    5' 6" (1 676 m)   Wt 50 6 kg (111 lb 9 6 oz)   BMI 18 01 kg/m²          Physical Exam   Constitutional: She is oriented to person, place, and time  Vital signs are normal  She appears well-developed and well-nourished  HENT:   Head: Normocephalic and atraumatic  Right Ear: Tympanic membrane, external ear and ear canal normal    Left Ear: Tympanic membrane, external ear and ear canal normal    Nose: Nose normal    Mouth/Throat: Uvula is midline, oropharynx is clear and moist and mucous membranes are normal    Eyes: Conjunctivae, EOM and lids are normal  Pupils are equal, round, and reactive to light  Neck: Trachea normal  Neck supple  No thyromegaly present  Cardiovascular: Normal rate, regular rhythm, S1 normal and S2 normal     No murmur heard  Pulmonary/Chest: Effort normal and breath sounds normal    Abdominal: Soft  Bowel sounds are normal  There is no hepatosplenomegaly  There is no tenderness  Lymphadenopathy:     She has no cervical adenopathy  Neurological: She is alert and oriented to person, place, and time  Skin: Skin is warm and dry     Psychiatric: Her speech is normal and behavior is normal  Judgment and thought content normal  Cognition and memory are normal

## 2018-05-26 ENCOUNTER — APPOINTMENT (OUTPATIENT)
Dept: LAB | Facility: MEDICAL CENTER | Age: 33
End: 2018-05-26
Payer: COMMERCIAL

## 2018-05-26 DIAGNOSIS — Z13.220 SCREENING FOR LIPID DISORDERS: ICD-10-CM

## 2018-05-26 DIAGNOSIS — Z13.29 SCREENING FOR THYROID DISORDER: ICD-10-CM

## 2018-05-26 DIAGNOSIS — Z13.1 SCREENING FOR DIABETES MELLITUS: ICD-10-CM

## 2018-05-26 LAB
ALBUMIN SERPL BCP-MCNC: 4.3 G/DL (ref 3.5–5)
ALP SERPL-CCNC: 67 U/L (ref 46–116)
ALT SERPL W P-5'-P-CCNC: 23 U/L (ref 12–78)
ANION GAP SERPL CALCULATED.3IONS-SCNC: 6 MMOL/L (ref 4–13)
AST SERPL W P-5'-P-CCNC: 17 U/L (ref 5–45)
BILIRUB SERPL-MCNC: 0.52 MG/DL (ref 0.2–1)
BUN SERPL-MCNC: 12 MG/DL (ref 5–25)
CALCIUM SERPL-MCNC: 9 MG/DL (ref 8.3–10.1)
CHLORIDE SERPL-SCNC: 104 MMOL/L (ref 100–108)
CHOLEST SERPL-MCNC: 142 MG/DL (ref 50–200)
CO2 SERPL-SCNC: 28 MMOL/L (ref 21–32)
CREAT SERPL-MCNC: 0.75 MG/DL (ref 0.6–1.3)
GFR SERPL CREATININE-BSD FRML MDRD: 123 ML/MIN/1.73SQ M
GLUCOSE P FAST SERPL-MCNC: 79 MG/DL (ref 65–99)
HDLC SERPL-MCNC: 53 MG/DL (ref 40–60)
LDLC SERPL CALC-MCNC: 77 MG/DL (ref 0–100)
POTASSIUM SERPL-SCNC: 4 MMOL/L (ref 3.5–5.3)
PROT SERPL-MCNC: 8 G/DL (ref 6.4–8.2)
SODIUM SERPL-SCNC: 138 MMOL/L (ref 136–145)
TRIGL SERPL-MCNC: 58 MG/DL
TSH SERPL DL<=0.05 MIU/L-ACNC: 1.63 UIU/ML (ref 0.36–3.74)

## 2018-05-26 PROCEDURE — 80061 LIPID PANEL: CPT

## 2018-05-26 PROCEDURE — 84443 ASSAY THYROID STIM HORMONE: CPT

## 2018-05-26 PROCEDURE — 80053 COMPREHEN METABOLIC PANEL: CPT

## 2018-05-26 PROCEDURE — 36415 COLL VENOUS BLD VENIPUNCTURE: CPT

## 2018-06-04 ENCOUNTER — TELEPHONE (OUTPATIENT)
Dept: FAMILY MEDICINE CLINIC | Facility: MEDICAL CENTER | Age: 33
End: 2018-06-04

## 2018-06-04 NOTE — TELEPHONE ENCOUNTER
Patient aware, Alexey Velazquez said that she was waiting to get her new work schedule to see when she would have days off  She will call and get the appt set up

## 2018-06-04 NOTE — TELEPHONE ENCOUNTER
----- Message from Sylvester Pak DO sent at 6/4/2018 12:41 PM EDT -----  I reviewed patient's labs and they are essentially normal   I do not see that she scheduled an appointment for Gastroenterology  Please find out what the delay is and please let me know

## 2018-06-13 ENCOUNTER — ANNUAL EXAM (OUTPATIENT)
Dept: OBGYN CLINIC | Facility: MEDICAL CENTER | Age: 33
End: 2018-06-13
Payer: COMMERCIAL

## 2018-06-13 VITALS
DIASTOLIC BLOOD PRESSURE: 70 MMHG | SYSTOLIC BLOOD PRESSURE: 120 MMHG | WEIGHT: 115 LBS | HEIGHT: 66 IN | BODY MASS INDEX: 18.48 KG/M2 | RESPIRATION RATE: 14 BRPM

## 2018-06-13 DIAGNOSIS — Z01.419 ENCOUNTER FOR ANNUAL ROUTINE GYNECOLOGICAL EXAMINATION: Primary | ICD-10-CM

## 2018-06-13 DIAGNOSIS — Z01.419 ENCOUNTER FOR GYNECOLOGICAL EXAMINATION WITHOUT ABNORMAL FINDING: ICD-10-CM

## 2018-06-13 PROCEDURE — G0145 SCR C/V CYTO,THINLAYER,RESCR: HCPCS | Performed by: OBSTETRICS & GYNECOLOGY

## 2018-06-13 PROCEDURE — 87624 HPV HI-RISK TYP POOLED RSLT: CPT | Performed by: OBSTETRICS & GYNECOLOGY

## 2018-06-13 PROCEDURE — 99395 PREV VISIT EST AGE 18-39: CPT | Performed by: OBSTETRICS & GYNECOLOGY

## 2018-06-13 NOTE — PROGRESS NOTES
Assessment/Plan:      Diagnoses and all orders for this visit:    Encounter for annual routine gynecological examination  -     Liquid-based pap, screening    Encounter for gynecological examination without abnormal finding          Subjective:     Patient ID: Marijo Gosselin is a 29 y o  female  Patient is a 36-year-old female, para 2, breast feeding,  not sexually active, declines birth control,  here for yearly gynecologic exam with a complaint of a lower abdominal pouch/shelf at her  incision  Review of systems is negative for vulvar vaginal or anal irritation  Negative for pelvic or abdominal pain  Negative for breast complaints  Past medical history is negative  Past surgical history significant for 2 C sections and a left breast lumpectomy  Current medications are prenatal vitamins  Patient has an allergy to penicillin which produces hives  Family history significant for father with colon cancer in his early 45s and a paternal grandmother with colon cancer  Patient has a referral for colonoscopy that her primary care doctor gave to her  Gynecologic Exam   The patient's pertinent negatives include no pelvic pain or vaginal discharge  Pertinent negatives include no abdominal pain  Review of Systems   Gastrointestinal: Negative for abdominal pain  Genitourinary: Negative for genital sores, pelvic pain and vaginal discharge  Objective:     Physical Exam   Constitutional: She is oriented to person, place, and time  She appears well-developed and well-nourished  HENT:   Head: Normocephalic  Eyes: Pupils are equal, round, and reactive to light  Neck: No thyromegaly present  Cardiovascular: Normal rate and regular rhythm  Pulmonary/Chest: Effort normal  No respiratory distress  She has no wheezes  She has no rales  Abdominal: Soft  She exhibits mass  She exhibits no distension  There is no tenderness  There is no rebound and no guarding     Genitourinary: Vagina normal and uterus normal    Neurological: She is alert and oriented to person, place, and time  Skin: Skin is warm  Psychiatric: She has a normal mood and affect   Her behavior is normal

## 2018-06-14 LAB — HPV RRNA GENITAL QL NAA+PROBE: ABNORMAL

## 2018-06-18 LAB
LAB AP GYN PRIMARY INTERPRETATION: NORMAL
LAB AP LMP: NORMAL
Lab: NORMAL

## 2018-06-19 NOTE — PROGRESS NOTES
Try calling the patient on 06/19/2018 to inform her of her results and they voice mail for the patient was full a result card was send out to the patient on 06/19/2018

## 2018-06-28 ENCOUNTER — OFFICE VISIT (OUTPATIENT)
Dept: GASTROENTEROLOGY | Facility: CLINIC | Age: 33
End: 2018-06-28
Payer: COMMERCIAL

## 2018-06-28 VITALS
WEIGHT: 113.25 LBS | HEIGHT: 66 IN | HEART RATE: 84 BPM | BODY MASS INDEX: 18.2 KG/M2 | SYSTOLIC BLOOD PRESSURE: 102 MMHG | DIASTOLIC BLOOD PRESSURE: 64 MMHG

## 2018-06-28 DIAGNOSIS — D50.8 OTHER IRON DEFICIENCY ANEMIA: ICD-10-CM

## 2018-06-28 DIAGNOSIS — Z12.11 SCREENING FOR COLON CANCER: Primary | ICD-10-CM

## 2018-06-28 DIAGNOSIS — Z80.0 FAMILY HX OF COLON CANCER: ICD-10-CM

## 2018-06-28 PROBLEM — D50.9 IRON DEFICIENCY ANEMIA: Status: ACTIVE | Noted: 2018-06-28

## 2018-06-28 PROCEDURE — 99244 OFF/OP CNSLTJ NEW/EST MOD 40: CPT | Performed by: INTERNAL MEDICINE

## 2018-06-28 NOTE — PROGRESS NOTES
[facility]    Dear Dr Ruslan Bee,     I had the pleasure of seeing your patient Cain Grier in the office today and I thank you for this kind referral        Chief Complaint:  Family history of colon cancer      HPI:  Cain Grier is a 29 y o  female who presents with a very compelling family history of colon cancer  According to the patient her father was diagnosed in his 35s, and the paternal grandmother was also diagnosed in her 35s  Both her still alive, her father with a colostomy and her grandmother with a colostomy and then subsequent reversal   The patient herself has been found to have anemia with microcytosis  Last hemoglobin was 8 6, hematocrit 26 8, MCV 81  The patient denies any epistaxis or bleeding gums  No heavy menses  No melena hematochezia or rectal bleeding  No hematuria  No bruising  No other obvious source of bleeding  She denies any abdominal pain, change in bowel habits, change in stool caliber, tenesmus, weight loss, or any other GI symptomatology  She has no upper GI symptomatology  She has no other complaints  She has never had endoscopy or colonoscopy in the past       Review of Systems:   Constitutional: No fever or chills, feels well, no tiredness, no recent weight gain or weight loss  HENT: No complaints of earache, no hearing loss, no nosebleeds, no nasal discharge, no sore throat, no hoarseness  Eyes: No complaints of eye pain, no red eyes, no discharge from eyes, no itchy eyes  Cardiovascular: No complaints of slow heart rate, no fast heart rate, no chest pain, no palpitations, no leg claudication, no lower extremity edema  Respiratory: No complaints of shortness of breath, no wheezing, no cough, no SOB on exertion, no orthopnea  Gastrointestinal: As noted in HPI  Genitourinary: No complaints of dysuria, no incontinence, no hesitancy, no nocturia     Musculoskeletal: No complaints of arthralgia, no myalgias, no joint swelling or stiffness, no limb pain or swelling  Neurological: No complaints of headache, no confusion, no convulsions, no numbness or tingling, no dizziness or fainting, no limb weakness, no difficulty walking  Skin: No complaints of skin rash or skin lesions, no itching, no skin wound, no dry skin  Hematological/Lymphatic: No complaints of swollen glands, does not bleed easy  Allergic/Immunologic: No immunocompromised state  Endocrine:  No complaints of polyuria, no polydipsia  Psychiatric/Behavioral: is not suicidal, no sleep disturbances, no anxiety or depression, no change in personality, no emotional problems  Historical Information   Past Medical History:   Diagnosis Date    Peanut allergy     Pregnancy     RESOLVED: [id number]    Seasonal allergies     Varicella     had as child before 9years old      Past Surgical History:   Procedure Laterality Date     SECTION      CYST REMOVAL      left breast, benign    [place]  DELIVERY ONLY N/A 2018    Procedure:  SECTION (); Surgeon: Niclo Johnson DO;  Location: [facility];  Service: Obstetrics     Social History   History   Alcohol Use    Yes     Comment: occasional use     History   Drug Use No     History   Smoking Status    Never Smoker   Smokeless Tobacco    Never Used     Family History   Problem Relation Age of Onset    Asthma Father     Hypertension Father     Vision loss Father     Thyroid disease Father     Colon cancer Father     Learning disabilities Son     Autism Son     Hypertension Maternal Grandmother     Asthma Cousin     Asthma Cousin     Stroke Mother         CVA    Migraines Mother     Kidney disease Paternal Grandmother          Current Medications: has a current medication list which includes the following prescription(s): prenatal vitamins         Vital Signs: /64   Pulse 84   Ht 5' 6" (1 676 m)   Wt 51 4 kg (113 lb 4 oz)   BMI 18 28 kg/m²     Physical Exam: Constitutional  General Appearance: No acute distress, well appearing and well nourished  Head  Normocephalic  Eyes  Conjunctivae and lids: No swelling, erythema, or discharge  Pupils and irises: Equal, round and reactive to light  Ears, Nose, Mouth, and Throat  External inspection of ears and nose: Normal  Nasal mucosa, septum and turbinates: Normal without edema or erythema/   Oropharynx: Normal with no erythema, edema, exudate or lesions  Neck  Normal range of motion  Neck supple  Cardiovascular  Auscultation of the heart: Normal rate and rhythm, normal S1 and S2 without murmurs  Examination of the extremities for edema and/or varicosities: Normal  Pulmonary/Chest  Respiratory effort: No increased work of breathing or signs of respiratory distress  Auscultation of lungs: Clear to auscultation, equal breath sounds bilaterally, no wheezes, rales, no rhonchi  Abdomen  Abdomen: Non-tender, no masses  Liver and spleen: No hepatomegaly or splenomegaly  Musculoskeletal  Gait and station: normal   Digits and Nails: normal without clubbing or cyanosis  Inspection/palpation of joints, bones, and muscles: Normal  Neurological  No nystagmus or asterixis  Skin  Skin and subcutaneous tissue: Normal without rashes or lesions  Lymphatic  Palpation of the lymph nodes in neck: No lymphadenopathy     Psychiatric  Orientation to person, place and time: Normal   Mood and affect: Normal          Labs:   Lab Results   Component Value Date    ALT 23 05/26/2018    AST 17 05/26/2018    BUN 12 05/26/2018    CALCIUM 9 0 05/26/2018     05/26/2018    CHOL 142 05/26/2018    CO2 28 05/26/2018    CREATININE 0 75 05/26/2018    HDL 53 05/26/2018    HCT 26 8 (L) 01/19/2018    HGB 8 6 (L) 01/19/2018     01/19/2018    K 4 0 05/26/2018     05/26/2018    TRIG 58 05/26/2018    WBC 14 62 (H) 01/19/2018         X-Rays & Procedures:   No orders to display ______________________________________________________________________      Assessment & Plan:      Diagnoses and all orders for this visit:    Screening for colon cancer    Family hx of colon cancer  -     Other iron deficiency anemia    Other orders  -     Diet NPO; Sips with meds; Standing  -     Void on call to OR; Standing  -     Insert peripheral IV; Standing  -     Diet NPO; Sips with meds; Standing  -     Void on call to OR; Standing  -     Insert peripheral IV; Standing          I have taken liberty of scheduling the patient for both EGD and colonoscopy and will likely also do a capsule endoscopy because of her anemia and lack of any obvious source of bleed  Her family history is very compelling and have also taken the liberty of scheduling her for a genetics consultation to rule out any hereditary colon cancer syndrome  I will be happy to inform you of the results and any further recommendations  I would like to thank you for allowing me to participate in her care            With warmest regards,    Louis Vanegas MD, [facility]

## 2018-06-28 NOTE — LETTER
June 28, 2018     [facility] Staff, DO  [address]  [facility] [address]    Patient: Huan Barragan   YOB: 1985   Date of Visit: 6/28/2018       Dear Dr Angélica Alanis: Thank you for referring Huan Barragan to me for evaluation  Below are my notes for this consultation  If you have questions, please do not hesitate to call me  I look forward to following your patient along with you  Sincerely,        Parveen San MD        CC: No Recipients  Parveen San MD  6/28/2018  1:32 PM  Sign at close encounter  [facility]    Dear Dr Angélica Alanis,     I had the pleasure of seeing your patient Huan Barragan in the office today and I thank you for this kind referral        Chief Complaint:  Family history of colon cancer      HPI:  Huan Barragan is a 29 y o  female who presents with a very compelling family history of colon cancer  According to the patient her father was diagnosed in his 35s, and the paternal grandmother was also diagnosed in her 35s  Both her still alive, her father with a colostomy and her grandmother with a colostomy and then subsequent reversal   The patient herself has been found to have anemia with microcytosis  Last hemoglobin was 8 6, hematocrit 26 8, MCV 81  The patient denies any epistaxis or bleeding gums  No heavy menses  No melena hematochezia or rectal bleeding  No hematuria  No bruising  No other obvious source of bleeding  She denies any abdominal pain, change in bowel habits, change in stool caliber, tenesmus, weight loss, or any other GI symptomatology  She has no upper GI symptomatology  She has no other complaints  She has never had endoscopy or colonoscopy in the past       Review of Systems:   Constitutional: No fever or chills, feels well, no tiredness, no recent weight gain or weight loss  HENT: No complaints of earache, no hearing loss, no nosebleeds, no nasal discharge, no sore throat, no hoarseness      Eyes: No complaints of eye pain, no red eyes, no discharge from eyes, no itchy eyes  Cardiovascular: No complaints of slow heart rate, no fast heart rate, no chest pain, no palpitations, no leg claudication, no lower extremity edema  Respiratory: No complaints of shortness of breath, no wheezing, no cough, no SOB on exertion, no orthopnea  Gastrointestinal: As noted in HPI  Genitourinary: No complaints of dysuria, no incontinence, no hesitancy, no nocturia  Musculoskeletal: No complaints of arthralgia, no myalgias, no joint swelling or stiffness, no limb pain or swelling  Neurological: No complaints of headache, no confusion, no convulsions, no numbness or tingling, no dizziness or fainting, no limb weakness, no difficulty walking  Skin: No complaints of skin rash or skin lesions, no itching, no skin wound, no dry skin  Hematological/Lymphatic: No complaints of swollen glands, does not bleed easy  Allergic/Immunologic: No immunocompromised state  Endocrine:  No complaints of polyuria, no polydipsia  Psychiatric/Behavioral: is not suicidal, no sleep disturbances, no anxiety or depression, no change in personality, no emotional problems  Historical Information   Past Medical History:   Diagnosis Date    Peanut allergy     Pregnancy     RESOLVED: [id number]    Seasonal allergies     Varicella     had as child before 9years old      Past Surgical History:   Procedure Laterality Date     SECTION      CYST REMOVAL      left breast, benign    [place]  DELIVERY ONLY N/A 2018    Procedure:  SECTION ();   Surgeon: Shruthi Ni DO;  Location: [facility] ;  Service: Obstetrics     Social History   History   Alcohol Use    Yes     Comment: occasional use     History   Drug Use No     History   Smoking Status    Never Smoker   Smokeless Tobacco    Never Used     Family History   Problem Relation Age of Onset    Asthma Father     Hypertension Father     Vision loss Father     Thyroid disease Father     Colon cancer Father     Learning disabilities Son     Autism Son     Hypertension Maternal Grandmother     Asthma Cousin     Asthma Cousin     Stroke Mother         CVA    Migraines Mother     Kidney disease Paternal Grandmother          Current Medications: has a current medication list which includes the following prescription(s): prenatal vitamins  Vital Signs: /64   Pulse 84   Ht 5' 6" (1 676 m)   Wt 51 4 kg (113 lb 4 oz)   BMI 18 28 kg/m²      Physical Exam:   Constitutional  General Appearance: No acute distress, well appearing and well nourished  Head  Normocephalic  Eyes  Conjunctivae and lids: No swelling, erythema, or discharge  Pupils and irises: Equal, round and reactive to light  Ears, Nose, Mouth, and Throat  External inspection of ears and nose: Normal  Nasal mucosa, septum and turbinates: Normal without edema or erythema/   Oropharynx: Normal with no erythema, edema, exudate or lesions  Neck  Normal range of motion  Neck supple  Cardiovascular  Auscultation of the heart: Normal rate and rhythm, normal S1 and S2 without murmurs  Examination of the extremities for edema and/or varicosities: Normal  Pulmonary/Chest  Respiratory effort: No increased work of breathing or signs of respiratory distress  Auscultation of lungs: Clear to auscultation, equal breath sounds bilaterally, no wheezes, rales, no rhonchi  Abdomen  Abdomen: Non-tender, no masses  Liver and spleen: No hepatomegaly or splenomegaly  Musculoskeletal  Gait and station: normal   Digits and Nails: normal without clubbing or cyanosis  Inspection/palpation of joints, bones, and muscles: Normal  Neurological  No nystagmus or asterixis  Skin  Skin and subcutaneous tissue: Normal without rashes or lesions  Lymphatic  Palpation of the lymph nodes in neck: No lymphadenopathy     Psychiatric  Orientation to person, place and time: Normal   Mood and affect: Normal          Labs: Lab Results   Component Value Date    ALT 23 05/26/2018    AST 17 05/26/2018    BUN 12 05/26/2018    CALCIUM 9 0 05/26/2018     05/26/2018    CHOL 142 05/26/2018    CO2 28 05/26/2018    CREATININE 0 75 05/26/2018    HDL 53 05/26/2018    HCT 26 8 (L) 01/19/2018    HGB 8 6 (L) 01/19/2018     01/19/2018    K 4 0 05/26/2018     05/26/2018    TRIG 58 05/26/2018    WBC 14 62 (H) 01/19/2018         X-Rays & Procedures:   No orders to display         ______________________________________________________________________      Assessment & Plan:      Diagnoses and all orders for this visit:    Screening for colon cancer    Family hx of colon cancer  -     Other iron deficiency anemia    Other orders  -     Diet NPO; Sips with meds; Standing  -     Void on call to OR; Standing  -     Insert peripheral IV; Standing  -     Diet NPO; Sips with meds; Standing  -     Void on call to OR; Standing  -     Insert peripheral IV; Standing          I have taken liberty of scheduling the patient for both EGD and colonoscopy and will likely also do a capsule endoscopy because of her anemia and lack of any obvious source of bleed  Her family history is very compelling and have also taken the liberty of scheduling her for a genetics consultation to rule out any hereditary colon cancer syndrome  I will be happy to inform you of the results and any further recommendations  I would like to thank you for allowing me to participate in her care            With warmest regards,    Anisha Clayton MD, [facility]

## 2018-07-18 ENCOUNTER — TELEPHONE (OUTPATIENT)
Dept: GASTROENTEROLOGY | Facility: CLINIC | Age: 33
End: 2018-07-18

## 2018-09-04 ENCOUNTER — HOSPITAL ENCOUNTER (EMERGENCY)
Facility: HOSPITAL | Age: 33
Discharge: HOME | End: 2018-09-04
Attending: EMERGENCY MEDICINE | Admitting: EMERGENCY MEDICINE
Payer: COMMERCIAL

## 2018-09-04 VITALS
OXYGEN SATURATION: 100 % | RESPIRATION RATE: 18 BRPM | SYSTOLIC BLOOD PRESSURE: 129 MMHG | DIASTOLIC BLOOD PRESSURE: 70 MMHG | HEART RATE: 103 BPM

## 2018-09-04 DIAGNOSIS — J02.9 PHARYNGITIS: Primary | ICD-10-CM

## 2018-09-04 LAB — S PYO AG THROAT QL: NEGATIVE

## 2018-09-04 PROCEDURE — 87430 STREP A AG IA: CPT | Performed by: EMERGENCY MEDICINE

## 2018-09-04 PROCEDURE — 99283 EMERGENCY DEPT VISIT LOW MDM: CPT

## 2018-09-04 RX ORDER — PREDNISONE 20 MG/1
60 TABLET ORAL ONCE
Status: COMPLETED | OUTPATIENT
Start: 2018-09-04 | End: 2018-09-04

## 2018-09-04 RX ORDER — PREDNISONE 20 MG/1
60 TABLET ORAL DAILY
Qty: 15 TABLET | Refills: 0 | Status: SHIPPED | OUTPATIENT
Start: 2018-09-04 | End: 2019-01-30 | Stop reason: ALTCHOICE

## 2018-09-04 RX ADMIN — PREDNISONE 60 MG: 20 TABLET ORAL at 17:59

## 2018-09-04 NOTE — ED PROVIDER NOTES
History  Chief Complaint   Patient presents with    Nasal Congestion     Since last night stuffy nose, body aches, and sore throat  29-year-old female patient presents emergency department for evaluation pharyngitis  The patient states that one of her family members had strep and was concerned she did as well  On physical exam the patient does have pharyngeal erythema with my able pustules found on the bilateral sides the tonsils  Patient has no fever, chills, chest pains, shortness of breath  The patient states pain  Rapid strep was done, found to be negative, patient be treated for viral pharyngitis  History provided by:  Patient   used: No    Sore Throat   Location:  Generalized  Quality:  Aching  Severity:  Mild  Onset quality:  Gradual  Timing:  Constant  Progression:  Worsening  Chronicity:  New  Relieved by:  Nothing  Worsened by:  Nothing  Ineffective treatments:  None tried  Associated symptoms: no chest pain, no cough, no headaches, no night sweats, no sinus congestion and no trouble swallowing        Prior to Admission Medications   Prescriptions Last Dose Informant Patient Reported? Taking? Prenatal Multivit-Min-Fe-FA (PRENATAL VITAMINS) 0 8 MG tablet  Self Yes No   Sig: Take 1 tablet by mouth daily      Facility-Administered Medications: None       Past Medical History:   Diagnosis Date    Peanut allergy     Pregnancy     RESOLVED: [id number]    Seasonal allergies     Varicella     had as child before 9years old        Past Surgical History:   Procedure Laterality Date     SECTION      CYST REMOVAL      left breast, benign    [place]  DELIVERY ONLY N/A 2018    Procedure:  SECTION ();   Surgeon: Sarika Zimmer DO;  Location: [facility];  Service: Obstetrics       Family History   Problem Relation Age of Onset    Asthma Father     Hypertension Father     Vision loss Father     Thyroid disease Father     Colon cancer Father     Learning disabilities Son     Autism Son     Hypertension Maternal Grandmother     Asthma Cousin     Asthma Cousin     Stroke Mother         CVA    Migraines Mother     Kidney disease Paternal Grandmother      I have reviewed and agree with the history as documented  Social History   Substance Use Topics    Smoking status: Never Smoker    Smokeless tobacco: Never Used    Alcohol use Yes      Comment: occasional use        Review of Systems   Constitutional: Negative for night sweats  HENT: Positive for sore throat  Negative for trouble swallowing  Respiratory: Negative for cough  Cardiovascular: Negative for chest pain  Neurological: Negative for headaches  All other systems reviewed and are negative  Physical Exam  Physical Exam   Constitutional: She is oriented to person, place, and time  She appears well-developed and well-nourished  HENT:   Head: Normocephalic and atraumatic  Right Ear: External ear normal    Left Ear: External ear normal    Mouth/Throat:       Eyes: Conjunctivae and EOM are normal    Neck: No JVD present  No tracheal deviation present  No thyromegaly present  Cardiovascular: Normal rate  Pulmonary/Chest: Effort normal and breath sounds normal  No stridor  Abdominal: Soft  She exhibits no distension and no mass  There is no tenderness  There is no guarding  No hernia  Musculoskeletal: Normal range of motion  She exhibits no edema, tenderness or deformity  Lymphadenopathy:     She has no cervical adenopathy  Neurological: She is alert and oriented to person, place, and time  Skin: Skin is warm  No rash noted  No erythema  No pallor  Psychiatric: She has a normal mood and affect  Her behavior is normal    Nursing note and vitals reviewed        Vital Signs  ED Triage Vitals [09/04/18 1653]   Temp Pulse Respirations Blood Pressure SpO2   -- 103 18 129/70 100 %      Temp src Heart Rate Source Patient Position - Orthostatic VS BP Location FiO2 (%) -- -- -- -- --      Pain Score       3           Vitals:    09/04/18 1653   BP: 129/70   Pulse: 103       Visual Acuity      ED Medications  Medications   predniSONE tablet 60 mg (60 mg Oral Given 9/4/18 1759)       Diagnostic Studies  Results Reviewed     Procedure Component Value Units Date/Time    Rapid Strep A Screen Only, Adults [[id number]]  (Normal) Collected:  09/04/18 1709    Lab Status:  Final result Specimen:  Throat from Throat Updated:  09/04/18 1722     Rapid Strep A Screen Negative                 No orders to display              Procedures  Procedures       Phone Contacts  ED Phone Contact    ED Course                               MDM  Number of Diagnoses or Management Options  Pharyngitis: new and requires workup     Amount and/or Complexity of Data Reviewed  Clinical lab tests: ordered and reviewed  Decide to obtain previous medical records or to obtain history from someone other than the patient: yes  Review and summarize past medical records: yes    Patient Progress  Patient progress: stable    CritCare Time    Disposition  Final diagnoses:   Pharyngitis     Time reflects when diagnosis was documented in both MDM as applicable and the Disposition within this note     Time User Action Codes Description Comment    9/4/2018  5:55 PM Seferino Huynh Add [J02 9] Pharyngitis       ED Disposition     ED Disposition Condition Comment    Discharge  Yuki Ferreira discharge to home/self care  Condition at discharge: Stable        Follow-up Information     Follow up With Specialties Details Why Contact Info    Norbert Vincent DO Family Medicine   [address]  [address] [facility] [address] [address] [phone]            Discharge Medication List as of 9/4/2018  5:55 PM      START taking these medications    Details   predniSONE 20 mg tablet Take 3 tablets (60 mg total) by mouth daily, Starting Tue 9/4/2018, Normal         CONTINUE these medications which have NOT CHANGED    Details   Prenatal Multivit-Min-Fe-FA (PRENATAL VITAMINS) 0 8 MG tablet Take 1 tablet by mouth daily, Historical Med           No discharge procedures on file      ED Provider  Electronically Signed by           Faar Figueroa DO  09/07/18 4560

## 2018-09-04 NOTE — DISCHARGE INSTRUCTIONS

## 2019-01-30 ENCOUNTER — APPOINTMENT (OUTPATIENT)
Dept: LAB | Facility: MEDICAL CENTER | Age: 34
End: 2019-01-30
Payer: COMMERCIAL

## 2019-01-30 ENCOUNTER — OFFICE VISIT (OUTPATIENT)
Dept: FAMILY MEDICINE CLINIC | Facility: MEDICAL CENTER | Age: 34
End: 2019-01-30
Payer: COMMERCIAL

## 2019-01-30 VITALS
HEART RATE: 80 BPM | SYSTOLIC BLOOD PRESSURE: 100 MMHG | HEIGHT: 66 IN | DIASTOLIC BLOOD PRESSURE: 70 MMHG | BODY MASS INDEX: 17.36 KG/M2 | RESPIRATION RATE: 14 BRPM | WEIGHT: 108 LBS

## 2019-01-30 DIAGNOSIS — R42 DIZZINESS: Primary | ICD-10-CM

## 2019-01-30 DIAGNOSIS — R63.6 UNDERWEIGHT: ICD-10-CM

## 2019-01-30 DIAGNOSIS — R51.9 NONINTRACTABLE HEADACHE, UNSPECIFIED CHRONICITY PATTERN, UNSPECIFIED HEADACHE TYPE: ICD-10-CM

## 2019-01-30 DIAGNOSIS — N91.2 AMENORRHEA: ICD-10-CM

## 2019-01-30 DIAGNOSIS — D64.9 ANEMIA, UNSPECIFIED TYPE: ICD-10-CM

## 2019-01-30 DIAGNOSIS — R42 DIZZINESS: ICD-10-CM

## 2019-01-30 LAB
ALBUMIN SERPL BCP-MCNC: 4.1 G/DL (ref 3.5–5)
ALP SERPL-CCNC: 50 U/L (ref 46–116)
ALT SERPL W P-5'-P-CCNC: 20 U/L (ref 12–78)
ANION GAP SERPL CALCULATED.3IONS-SCNC: 6 MMOL/L (ref 4–13)
AST SERPL W P-5'-P-CCNC: 13 U/L (ref 5–45)
BASOPHILS # BLD AUTO: 0.02 THOUSANDS/ΜL (ref 0–0.1)
BASOPHILS NFR BLD AUTO: 0 % (ref 0–1)
BILIRUB SERPL-MCNC: 0.64 MG/DL (ref 0.2–1)
BUN SERPL-MCNC: 10 MG/DL (ref 5–25)
CALCIUM SERPL-MCNC: 8.9 MG/DL (ref 8.3–10.1)
CHLORIDE SERPL-SCNC: 106 MMOL/L (ref 100–108)
CO2 SERPL-SCNC: 28 MMOL/L (ref 21–32)
CREAT SERPL-MCNC: 0.78 MG/DL (ref 0.6–1.3)
EOSINOPHIL # BLD AUTO: 0.09 THOUSAND/ΜL (ref 0–0.61)
EOSINOPHIL NFR BLD AUTO: 1 % (ref 0–6)
ERYTHROCYTE [DISTWIDTH] IN BLOOD BY AUTOMATED COUNT: 14.3 % (ref 11.6–15.1)
EST. AVERAGE GLUCOSE BLD GHB EST-MCNC: 114 MG/DL
FERRITIN SERPL-MCNC: 23 NG/ML (ref 8–388)
GFR SERPL CREATININE-BSD FRML MDRD: 116 ML/MIN/1.73SQ M
GLUCOSE SERPL-MCNC: 76 MG/DL (ref 65–140)
HBA1C MFR BLD: 5.6 % (ref 4.2–6.3)
HCG SERPL QL: NEGATIVE
HCT VFR BLD AUTO: 38.9 % (ref 34.8–46.1)
HGB BLD-MCNC: 12.1 G/DL (ref 11.5–15.4)
IMM GRANULOCYTES # BLD AUTO: 0.02 THOUSAND/UL (ref 0–0.2)
IMM GRANULOCYTES NFR BLD AUTO: 0 % (ref 0–2)
IRON SATN MFR SERPL: 38 %
IRON SERPL-MCNC: 142 UG/DL (ref 50–170)
LYMPHOCYTES # BLD AUTO: 1.66 THOUSANDS/ΜL (ref 0.6–4.47)
LYMPHOCYTES NFR BLD AUTO: 27 % (ref 14–44)
MCH RBC QN AUTO: 27.5 PG (ref 26.8–34.3)
MCHC RBC AUTO-ENTMCNC: 31.1 G/DL (ref 31.4–37.4)
MCV RBC AUTO: 88 FL (ref 82–98)
MONOCYTES # BLD AUTO: 0.37 THOUSAND/ΜL (ref 0.17–1.22)
MONOCYTES NFR BLD AUTO: 6 % (ref 4–12)
NEUTROPHILS # BLD AUTO: 4.07 THOUSANDS/ΜL (ref 1.85–7.62)
NEUTS SEG NFR BLD AUTO: 66 % (ref 43–75)
NRBC BLD AUTO-RTO: 0 /100 WBCS
PLATELET # BLD AUTO: 240 THOUSANDS/UL (ref 149–390)
PMV BLD AUTO: 10.6 FL (ref 8.9–12.7)
POTASSIUM SERPL-SCNC: 4 MMOL/L (ref 3.5–5.3)
PROT SERPL-MCNC: 7.6 G/DL (ref 6.4–8.2)
RBC # BLD AUTO: 4.4 MILLION/UL (ref 3.81–5.12)
SODIUM SERPL-SCNC: 140 MMOL/L (ref 136–145)
TIBC SERPL-MCNC: 373 UG/DL (ref 250–450)
TSH SERPL DL<=0.05 MIU/L-ACNC: 1.98 UIU/ML (ref 0.36–3.74)
WBC # BLD AUTO: 6.23 THOUSAND/UL (ref 4.31–10.16)

## 2019-01-30 PROCEDURE — 3008F BODY MASS INDEX DOCD: CPT | Performed by: FAMILY MEDICINE

## 2019-01-30 PROCEDURE — 83550 IRON BINDING TEST: CPT

## 2019-01-30 PROCEDURE — 85025 COMPLETE CBC W/AUTO DIFF WBC: CPT

## 2019-01-30 PROCEDURE — 83540 ASSAY OF IRON: CPT

## 2019-01-30 PROCEDURE — 99214 OFFICE O/P EST MOD 30 MIN: CPT | Performed by: FAMILY MEDICINE

## 2019-01-30 PROCEDURE — 83036 HEMOGLOBIN GLYCOSYLATED A1C: CPT

## 2019-01-30 PROCEDURE — 84443 ASSAY THYROID STIM HORMONE: CPT

## 2019-01-30 PROCEDURE — 80053 COMPREHEN METABOLIC PANEL: CPT

## 2019-01-30 PROCEDURE — 36415 COLL VENOUS BLD VENIPUNCTURE: CPT

## 2019-01-30 PROCEDURE — 84703 CHORIONIC GONADOTROPIN ASSAY: CPT

## 2019-01-30 PROCEDURE — 82728 ASSAY OF FERRITIN: CPT

## 2019-01-30 NOTE — PROGRESS NOTES
Assessment/Plan:    No problem-specific Assessment & Plan notes found for this encounter  Diagnoses and all orders for this visit:    Dizziness  -     CBC and differential; Future  -     Comprehensive metabolic panel; Future  -     TSH, 3rd generation with Free T4 reflex; Future  -     Hemoglobin A1C; Future  Nonintractable headache, unspecified chronicity pattern, unspecified headache type  Amenorrhea  -     Pregnancy Test (HCG Qualitative); Future  Anemia, unspecified type  -     CBC and differential; Future  -     Iron Panel; Future  Underweight  -     CBC and differential; Future  -     Comprehensive metabolic panel; Future  -     TSH, 3rd generation with Free T4 reflex; Future  -     Hemoglobin A1C; Future    Exact etiology of symptoms unclear any workup is required  Patient did become dizzy when I performed the modified Hallpike however I do not believe this is vestibular dysfunction alone  Review of records shows previous anemia  She also has amenorrhea and is underweight  I am going to get some labs for further evaluation including a pregnancy test   Patient was encouraged to eat more as she does need to put on weight  Will follow up lab results and proceed from there  Follow-up in one month if symptoms persist or sooner if needed  Subjective:      Patient ID: Joanna Cannon is a 29 y o  female  Patient presents with a chief complaint of dizziness  Described as a lightheaded feeling as well as a room spinning sensation  Has associated headache as well  Symptoms started about three weeks ago  Symptoms have been persistent the past three weeks  Getting up too fast exacerbates the dizziness  Rapid head movement does not seem to cause dizziness  Sitting for a few minutes does help to improve her symptoms particular the dizziness when it occurs  Headaches seem to occur in the evening with no obvious cause    Patient states she is eating regularly and eats often but cannot seem to gain weight  Her last period was also on 12/15/2018 and patient is currently sexually active, does not use protection and is not on any form of contraception  The following portions of the patient's history were reviewed and updated as appropriate:   She   Patient Active Problem List    Diagnosis Date Noted    Dizziness 01/30/2019    Nonintractable headache 01/30/2019    Amenorrhea 01/30/2019    Anemia 01/30/2019    Underweight 01/30/2019    Family history of colon cancer 06/28/2018    Iron deficiency anemia 06/28/2018    Family hx of colon cancer 05/23/2018    Left breast lump 07/05/2017     No current outpatient prescriptions on file  No current facility-administered medications for this visit  She is allergic to other; penicillins; and pollen extract       Review of Systems   Constitutional: Negative for fever  Respiratory: Negative for shortness of breath  Cardiovascular: Negative for chest pain  Objective:      /70   Pulse 80   Resp 14   Ht 5' 6" (1 676 m)   Wt 49 kg (108 lb)   BMI 17 43 kg/m²          Physical Exam   Constitutional: She is oriented to person, place, and time  Vital signs are normal  She appears well-developed and well-nourished  HENT:   Head: Normocephalic and atraumatic  Right Ear: Tympanic membrane, external ear and ear canal normal    Left Ear: Tympanic membrane, external ear and ear canal normal    Nose: Nose normal    Mouth/Throat: Uvula is midline, oropharynx is clear and moist and mucous membranes are normal    Eyes: Pupils are equal, round, and reactive to light  Conjunctivae, EOM and lids are normal    Neck: Trachea normal  Neck supple  No thyromegaly present  Cardiovascular: Normal rate, regular rhythm, S1 normal and S2 normal     No murmur heard  Pulmonary/Chest: Effort normal and breath sounds normal    Lymphadenopathy:     She has no cervical adenopathy  Neurological: She is alert and oriented to person, place, and time   No cranial nerve deficit  Modified Hallpike maneuver did reproduce symptoms bilaterally  Skin: Skin is warm, dry and intact  Psychiatric: She has a normal mood and affect   Her speech is normal and behavior is normal  Thought content normal

## 2019-02-01 ENCOUNTER — TELEPHONE (OUTPATIENT)
Dept: OBGYN CLINIC | Facility: MEDICAL CENTER | Age: 34
End: 2019-02-01

## 2019-02-01 NOTE — TELEPHONE ENCOUNTER
Spoke with pt - she did a CBC and CMP 01/30 - results in her chart  Please review and advise  Thanks!

## 2019-02-01 NOTE — TELEPHONE ENCOUNTER
You may order a CBC with diff and a CMP  Have her repeat her urine pregnancy test in 2 weeks  If her symptoms persist she may have an office visit with me

## 2019-02-01 NOTE — TELEPHONE ENCOUNTER
Spoke with pt - LMP 12/15 - 12/20  No menses since  Was heavy the first 2 days but then light  She is not on any form of birth control  Sexually active  Actively trying to conceive  For the last week or so, she has been feeling nauseous and dizzy  She took and HPT around 01/20 - was negative  Since still feeling off - when to her PCP on 01/30 due to the dizziness  Had her go for an HCG - also negative  Patient is worried  Please advise  Thanks!

## 2019-02-13 ENCOUNTER — TELEPHONE (OUTPATIENT)
Dept: FAMILY MEDICINE CLINIC | Facility: MEDICAL CENTER | Age: 34
End: 2019-02-13

## 2019-02-13 DIAGNOSIS — R63.6 UNDERWEIGHT: Primary | ICD-10-CM

## 2019-02-13 NOTE — TELEPHONE ENCOUNTER
Referral generated  Central scheduling should be calling patient to schedule this  Please provide patient with the number for central scheduling as well

## 2019-02-13 NOTE — TELEPHONE ENCOUNTER
You told her she was underweight  Wants to know if there is a vitamin you can give to her to help her gain weight

## 2019-02-13 NOTE — TELEPHONE ENCOUNTER
There is not but I can refer her to a nutritionist so they can discuss a meal plan to help patient gain weight

## 2019-04-18 NOTE — OB LABOR/OXYTOCIN SAFETY PROGRESS
Oxytocin Safety Progress Check Note - Osvaldo Comfort 33 y o  female MRN: [id number]    Unit/Bed#: -01 Encounter: [id number]    Obstetric History       T1      L1     SAB0   TAB0   Ectopic0   Multiple0   Live Births1    Obstetric Comments   Second trimester bleeding, new paternity, prior       Gestational Age: 41w1d  Dose (flavia-units/min) Oxytocin: 2 flavia-units/min  Contraction Frequency (minutes): 1-4  Contraction Quality: Moderate  Tachysystole: No   Dilation: 8        Effacement (%): 80  Station: -1  Baseline Rate: 155 bpm  Fetal Heart Rate: 150 BPM  FHR Category: Category II          Notes/comments:   Patient continues to be comfortable, making some cervical change on exam sporadic lates noted, resolved after maternal repositioning  Pit at 2    Continue to monitor      Case discussed with Dr Dayami Skinner MD 2018 8:38 PM 0 = independent

## 2019-10-21 ENCOUNTER — HOSPITAL ENCOUNTER (EMERGENCY)
Facility: HOSPITAL | Age: 34
Discharge: HOME | End: 2019-10-21
Attending: EMERGENCY MEDICINE | Admitting: EMERGENCY MEDICINE
Payer: COMMERCIAL

## 2019-10-21 VITALS
WEIGHT: 108.03 LBS | HEART RATE: 73 BPM | RESPIRATION RATE: 16 BRPM | DIASTOLIC BLOOD PRESSURE: 49 MMHG | SYSTOLIC BLOOD PRESSURE: 104 MMHG | HEIGHT: 66 IN | BODY MASS INDEX: 17.36 KG/M2 | OXYGEN SATURATION: 100 % | TEMPERATURE: 98.4 F

## 2019-10-21 DIAGNOSIS — B34.9 VIRAL ILLNESS: Primary | ICD-10-CM

## 2019-10-21 LAB
ALBUMIN SERPL BCP-MCNC: 3.1 G/DL (ref 3.5–5)
ALP SERPL-CCNC: 50 U/L (ref 46–116)
ALT SERPL W P-5'-P-CCNC: 19 U/L (ref 12–78)
ANION GAP SERPL CALCULATED.3IONS-SCNC: 7 MMOL/L (ref 4–13)
AST SERPL W P-5'-P-CCNC: 14 U/L (ref 5–45)
BASOPHILS # BLD AUTO: 0.02 THOUSANDS/ΜL (ref 0–0.1)
BASOPHILS NFR BLD AUTO: 0 % (ref 0–1)
BILIRUB SERPL-MCNC: 0.4 MG/DL (ref 0.2–1)
BILIRUB UR QL STRIP: NEGATIVE
BUN SERPL-MCNC: 8 MG/DL (ref 5–25)
CALCIUM SERPL-MCNC: 7.7 MG/DL (ref 8.3–10.1)
CHLORIDE SERPL-SCNC: 108 MMOL/L (ref 100–108)
CLARITY UR: CLEAR
CO2 SERPL-SCNC: 26 MMOL/L (ref 21–32)
COLOR UR: YELLOW
CREAT SERPL-MCNC: 0.63 MG/DL (ref 0.6–1.3)
EOSINOPHIL # BLD AUTO: 0.01 THOUSAND/ΜL (ref 0–0.61)
EOSINOPHIL NFR BLD AUTO: 0 % (ref 0–6)
ERYTHROCYTE [DISTWIDTH] IN BLOOD BY AUTOMATED COUNT: 14.1 % (ref 11.6–15.1)
EXT PREG TEST URINE: NEGATIVE
EXT. CONTROL ED NAV: NORMAL
GFR SERPL CREATININE-BSD FRML MDRD: 136 ML/MIN/1.73SQ M
GLUCOSE SERPL-MCNC: 78 MG/DL (ref 65–140)
GLUCOSE UR STRIP-MCNC: NEGATIVE MG/DL
HCT VFR BLD AUTO: 39.2 % (ref 34.8–46.1)
HGB BLD-MCNC: 12.2 G/DL (ref 11.5–15.4)
HGB UR QL STRIP.AUTO: NEGATIVE
IMM GRANULOCYTES # BLD AUTO: 0.01 THOUSAND/UL (ref 0–0.2)
IMM GRANULOCYTES NFR BLD AUTO: 0 % (ref 0–2)
KETONES UR STRIP-MCNC: NEGATIVE MG/DL
LEUKOCYTE ESTERASE UR QL STRIP: NEGATIVE
LYMPHOCYTES # BLD AUTO: 1.89 THOUSANDS/ΜL (ref 0.6–4.47)
LYMPHOCYTES NFR BLD AUTO: 21 % (ref 14–44)
MCH RBC QN AUTO: 27.4 PG (ref 26.8–34.3)
MCHC RBC AUTO-ENTMCNC: 31.1 G/DL (ref 31.4–37.4)
MCV RBC AUTO: 88 FL (ref 82–98)
MONOCYTES # BLD AUTO: 0.57 THOUSAND/ΜL (ref 0.17–1.22)
MONOCYTES NFR BLD AUTO: 6 % (ref 4–12)
NEUTROPHILS # BLD AUTO: 6.53 THOUSANDS/ΜL (ref 1.85–7.62)
NEUTS SEG NFR BLD AUTO: 73 % (ref 43–75)
NITRITE UR QL STRIP: NEGATIVE
NRBC BLD AUTO-RTO: 0 /100 WBCS
PH UR STRIP.AUTO: 6.5 [PH]
PLATELET # BLD AUTO: 229 THOUSANDS/UL (ref 149–390)
PMV BLD AUTO: 10.7 FL (ref 8.9–12.7)
POTASSIUM SERPL-SCNC: 4.1 MMOL/L (ref 3.5–5.3)
PROT SERPL-MCNC: 6.1 G/DL (ref 6.4–8.2)
PROT UR STRIP-MCNC: NEGATIVE MG/DL
RBC # BLD AUTO: 4.45 MILLION/UL (ref 3.81–5.12)
SODIUM SERPL-SCNC: 141 MMOL/L (ref 136–145)
SP GR UR STRIP.AUTO: 1.02 (ref 1–1.03)
UROBILINOGEN UR QL STRIP.AUTO: 1 E.U./DL
WBC # BLD AUTO: 9.03 THOUSAND/UL (ref 4.31–10.16)

## 2019-10-21 PROCEDURE — 81025 URINE PREGNANCY TEST: CPT | Performed by: EMERGENCY MEDICINE

## 2019-10-21 PROCEDURE — 99284 EMERGENCY DEPT VISIT MOD MDM: CPT | Performed by: EMERGENCY MEDICINE

## 2019-10-21 PROCEDURE — 81003 URINALYSIS AUTO W/O SCOPE: CPT | Performed by: EMERGENCY MEDICINE

## 2019-10-21 PROCEDURE — 36415 COLL VENOUS BLD VENIPUNCTURE: CPT | Performed by: EMERGENCY MEDICINE

## 2019-10-21 PROCEDURE — 80053 COMPREHEN METABOLIC PANEL: CPT | Performed by: EMERGENCY MEDICINE

## 2019-10-21 PROCEDURE — 96374 THER/PROPH/DIAG INJ IV PUSH: CPT

## 2019-10-21 PROCEDURE — 99284 EMERGENCY DEPT VISIT MOD MDM: CPT

## 2019-10-21 PROCEDURE — 96361 HYDRATE IV INFUSION ADD-ON: CPT

## 2019-10-21 PROCEDURE — 85025 COMPLETE CBC W/AUTO DIFF WBC: CPT | Performed by: EMERGENCY MEDICINE

## 2019-10-21 RX ORDER — DIPHENHYDRAMINE HYDROCHLORIDE 50 MG/ML
50 INJECTION INTRAMUSCULAR; INTRAVENOUS ONCE
Status: COMPLETED | OUTPATIENT
Start: 2019-10-21 | End: 2019-10-21

## 2019-10-21 RX ADMIN — SODIUM CHLORIDE 1000 ML: 0.9 INJECTION, SOLUTION INTRAVENOUS at 18:42

## 2019-10-21 RX ADMIN — DIPHENHYDRAMINE HYDROCHLORIDE 50 MG: 50 INJECTION, SOLUTION INTRAMUSCULAR; INTRAVENOUS at 18:44

## 2019-10-24 NOTE — ED PROVIDER NOTES
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 and then working all night  The patient be evaluated for her generalized malaise and fatigue  History provided by:  Patient   used: No    Dizziness   Quality:  Lightheadedness  Severity:  Mild  Onset quality:  Gradual  Timing:  Intermittent  Relieved by:  Nothing  Worsened by:  Nothing  Ineffective treatments:  None tried  Associated symptoms: no blood in stool, no chest pain, no hearing loss, no palpitations, no shortness of breath and no vision changes        None       Past Medical History:   Diagnosis Date    Peanut allergy     Pregnancy     RESOLVED:     Seasonal allergies     Varicella     had as child before 9years old        Past Surgical History:   Procedure Laterality Date     SECTION      CYST REMOVAL      left breast, benign    [place]  DELIVERY ONLY N/A 2018    Procedure:  SECTION (); Surgeon: Anabelle Moon DO;  Location: [facility] ;  Service: Obstetrics       Family History   Problem Relation Age of Onset    Asthma Father     Hypertension Father     Vision loss Father     Thyroid disease Father     Colon cancer Father     Learning disabilities Son     Autism Son     Hypertension Maternal Grandmother     Asthma Cousin     Asthma Cousin     Stroke Mother         CVA    Migraines Mother     Kidney disease Paternal Grandmother      I have reviewed and agree with the history as documented      Social History     Tobacco Use    Smoking status: Never Smoker    Smokeless tobacco: Never Used   Substance Use Topics    Alcohol use: Yes     Comment: occasional use    Drug use: No        Review of Systems   HENT: Negative for hearing loss  Respiratory: Negative for shortness of breath  Cardiovascular: Negative for chest pain and palpitations  Gastrointestinal: Negative for blood in stool  Neurological: Positive for dizziness  All other systems reviewed and are negative  Physical Exam  Physical Exam   Constitutional: She is oriented to person, place, and time  She appears well-developed and well-nourished  HENT:   Head: Normocephalic and atraumatic  Right Ear: External ear normal    Left Ear: External ear normal    Eyes: Conjunctivae and EOM are normal    Neck: No JVD present  No tracheal deviation present  No thyromegaly present  Cardiovascular: Normal rate  Pulmonary/Chest: Effort normal and breath sounds normal  No stridor  Abdominal: Soft  She exhibits no distension and no mass  There is no tenderness  There is no guarding  No hernia  Musculoskeletal: Normal range of motion  She exhibits no edema, tenderness or deformity  Lymphadenopathy:     She has no cervical adenopathy  Neurological: She is alert and oriented to person, place, and time  Skin: Skin is warm  No rash noted  No erythema  No pallor  Psychiatric: She has a normal mood and affect  Her behavior is normal    Nursing note and vitals reviewed        Vital Signs  ED Triage Vitals   Temperature Pulse Respirations Blood Pressure SpO2   10/21/19 1644 10/21/19 1644 10/21/19 1644 10/21/19 1644 10/21/19 1644   98 4 °F (36 9 °C) 69 18 (!) 101/46 99 %      Temp Source Heart Rate Source Patient Position - Orthostatic VS BP Location FiO2 (%)   10/21/19 1644 10/21/19 1644 10/21/19 1644 10/21/19 1644 --   Oral Monitor Sitting Left arm       Pain Score       10/21/19 1843       5           Vitals:    10/21/19 1644 10/21/19 1947   BP: (!) 101/46 (!) 104/49   Pulse: 69 73   Patient Position - Orthostatic VS: Sitting Lying         Visual Acuity      ED Medications  Medications   sodium chloride 0 9 % bolus 1,000 mL (0 mL Intravenous Stopped 10/21/19 1942)   diphenhydrAMINE (BENADRYL) injection 50 mg (50 mg Intravenous Given 10/21/19 1844)       Diagnostic Studies  Results Reviewed     Procedure Component Value Units Date/Time    POCT pregnancy, urine [[id number]]  (Normal) Resulted:  10/21/19 2006    Lab Status:  Final result Updated:  10/21/19 2036     EXT PREG TEST UR (Ref: Negative) Negative     Control Valid    Comprehensive metabolic panel [[id number]]  (Abnormal) Collected:  10/21/19 1945    Lab Status:  Final result Specimen:  Blood from Arm, Right Updated:  10/21/19 2015     Sodium 141 mmol/L      Potassium 4 1 mmol/L      Chloride 108 mmol/L      CO2 26 mmol/L      ANION GAP 7 mmol/L      BUN 8 mg/dL      Creatinine 0 63 mg/dL      Glucose 78 mg/dL      Calcium 7 7 mg/dL      AST 14 U/L      ALT 19 U/L      Alkaline Phosphatase 50 U/L      Total Protein 6 1 g/dL      Albumin 3 1 g/dL      Total Bilirubin 0 40 mg/dL      eGFR 136 ml/min/1 73sq m     Narrative:       [place] guidelines for Chronic Kidney Disease (CKD):     Stage 1 with normal or high GFR (GFR > 90 mL/min/1 73 square meters)    Stage 2 Mild CKD (GFR = 60-89 mL/min/1 73 square meters)    Stage 3A Moderate CKD (GFR = 45-59 mL/min/1 73 square meters)    Stage 3B Moderate CKD (GFR = 30-44 mL/min/1 73 square meters)    Stage 4 Severe CKD (GFR = 15-29 mL/min/1 73 square meters)    Stage 5 End Stage CKD (GFR <15 mL/min/1 73 square meters)  Note: GFR calculation is accurate only with a steady state creatinine    UA w Reflex to Microscopic w Reflex to Culture [[id number]] Collected:  10/21/19 1955    Lab Status:  Final result Specimen:  Urine, Clean Catch Updated:  10/21/19 2004     Color, UA Yellow     Clarity, UA Clear     Specific Mark, UA 1 020     pH, UA 6 5     Leukocytes, UA Negative     Nitrite, UA Negative     Protein, UA Negative mg/dl      Glucose, UA Negative mg/dl      Ketones, UA Negative mg/dl      Urobilinogen, UA 1 0 E U /dl      Bilirubin, UA Negative     Blood, UA Negative    CBC and differential [[id number]]  (Abnormal) Collected:  10/21/19 1842    Lab Status:  Final result Specimen:  Blood from Arm, Right Updated:  10/21/19 1907     WBC 9 03 Thousand/uL      RBC 4 45 Million/uL      Hemoglobin 12 2 g/dL      Hematocrit 39 2 %      MCV 88 fL      MCH 27 4 pg      MCHC 31 1 g/dL      RDW 14 1 %      MPV 10 7 fL      Platelets 220 Thousands/uL      nRBC 0 /100 WBCs      Neutrophils Relative 73 %      Immat GRANS % 0 %      Lymphocytes Relative 21 %      Monocytes Relative 6 %      Eosinophils Relative 0 %      Basophils Relative 0 %      Neutrophils Absolute 6 53 Thousands/µL      Immature Grans Absolute 0 01 Thousand/uL      Lymphocytes Absolute 1 89 Thousands/µL      Monocytes Absolute 0 57 Thousand/µL      Eosinophils Absolute 0 01 Thousand/µL      Basophils Absolute 0 02 Thousands/µL                  No orders to display              Procedures  Procedures       ED Course                               MDM  Number of Diagnoses or Management Options  Viral illness: new and requires workup     Amount and/or Complexity of Data Reviewed  Clinical lab tests: ordered and reviewed  Tests in the radiology section of CPT®: ordered and reviewed  Decide to obtain previous medical records or to obtain history from someone other than the patient: yes  Review and summarize past medical records: yes    Patient Progress  Patient progress: stable      Disposition  Final diagnoses:   Viral illness     Time reflects when diagnosis was documented in both MDM as applicable and the Disposition within this note     Time User Action Codes Description Comment    10/21/2019  8:25 PM Vazquez Carlisle Add [B34 9] Viral illness       ED Disposition     ED Disposition Condition Date/Time Comment    Discharge Stable Mon Oct 21, 2019  8:25 PM Analisa Thurston discharge to home/self care             Follow-up Information     Follow up With Specialties Details Why Contact Info    Ting Bueno DO Family Medicine   [address] [facility] [address]  [phone]            There are no discharge medications for this patient  No discharge procedures on file      ED Provider  Electronically Signed by           Ana Reddy DO  10/23/19 8185

## 2019-12-12 ENCOUNTER — ANNUAL EXAM (OUTPATIENT)
Dept: OBGYN CLINIC | Facility: MEDICAL CENTER | Age: 34
End: 2019-12-12
Payer: COMMERCIAL

## 2019-12-12 VITALS
BODY MASS INDEX: 17.13 KG/M2 | WEIGHT: 106.6 LBS | DIASTOLIC BLOOD PRESSURE: 70 MMHG | RESPIRATION RATE: 14 BRPM | HEIGHT: 66 IN | SYSTOLIC BLOOD PRESSURE: 100 MMHG

## 2019-12-12 DIAGNOSIS — Z01.419 ENCOUNTER FOR GYNECOLOGICAL EXAMINATION WITHOUT ABNORMAL FINDING: ICD-10-CM

## 2019-12-12 DIAGNOSIS — Z01.419 ENCOUNTER FOR ANNUAL ROUTINE GYNECOLOGICAL EXAMINATION: Primary | ICD-10-CM

## 2019-12-12 PROCEDURE — G0143 SCR C/V CYTO,THINLAYER,RESCR: HCPCS | Performed by: OBSTETRICS & GYNECOLOGY

## 2019-12-12 PROCEDURE — 87624 HPV HI-RISK TYP POOLED RSLT: CPT | Performed by: OBSTETRICS & GYNECOLOGY

## 2019-12-12 PROCEDURE — 99395 PREV VISIT EST AGE 18-39: CPT | Performed by: OBSTETRICS & GYNECOLOGY

## 2019-12-12 RX ORDER — NORETHINDRONE ACETATE AND ETHINYL ESTRADIOL 1MG-20(21)
1 KIT ORAL DAILY
Qty: 28 TABLET | Refills: 11 | Status: SHIPPED | OUTPATIENT
Start: 2019-12-12 | End: 2021-07-22 | Stop reason: SDUPTHER

## 2019-12-12 NOTE — PROGRESS NOTES
Assessment/Plan:      Diagnoses and all orders for this visit:    Encounter for annual routine gynecological examination  -     Liquid-based pap, screening; Future  -     Liquid-based pap, screening  -     norethindrone-ethinyl estradiol (JUNEL FE 1/20) 1-20 MG-MCG per tablet; Take 1 tablet by mouth daily    Encounter for gynecological examination without abnormal finding          Subjective:     Patient ID: Huy Carroll is a 36 y o  female  Patient is a 30-year-old female, para 2, here for yearly gynecologic exam without complaint  Patient requests an oral contraceptive and screening for STDs  Review of systems is positive for regular menses  Negative for pelvic or abdominal pain  Negative for breast complaints  Negative for vulvar vaginal or anal irritation  We discussed the risks benefits and alternatives to combination oral contraceptives  Patient has no contraindications to taking a combination oral contraceptive  Review of Systems   Constitutional: Negative for fever  Gastrointestinal: Negative for abdominal pain  Genitourinary: Negative for menstrual problem, pelvic pain, vaginal discharge and vaginal pain  Psychiatric/Behavioral: Negative for dysphoric mood  Objective:     Physical Exam   Constitutional: She is oriented to person, place, and time  She appears well-developed and well-nourished  HENT:   Head: Normocephalic  Cardiovascular: Normal rate, regular rhythm and normal heart sounds  Pulmonary/Chest: Effort normal and breath sounds normal  Right breast exhibits no inverted nipple, no mass, no nipple discharge, no skin change and no tenderness  Left breast exhibits no inverted nipple, no mass, no nipple discharge, no skin change and no tenderness  Abdominal: Soft  She exhibits no distension and no mass  There is no tenderness  There is no rebound and no guarding  No hernia   Hernia confirmed negative in the right inguinal area and confirmed negative in the left inguinal area  Genitourinary: Vagina normal and uterus normal  Rectal exam shows no external hemorrhoid  There is no rash, tenderness, lesion or injury on the right labia  There is no rash, tenderness, lesion or injury on the left labia  Uterus is not deviated, not enlarged, not fixed and not tender  Cervix exhibits no motion tenderness, no discharge and no friability  Right adnexum displays no mass, no tenderness and no fullness  Left adnexum displays no mass, no tenderness and no fullness  No erythema, tenderness or bleeding in the vagina  No foreign body in the vagina  No signs of injury around the vagina  No vaginal discharge found  Lymphadenopathy: No inguinal adenopathy noted on the right or left side  Neurological: She is alert and oriented to person, place, and time  Skin: Skin is warm and dry  Psychiatric: She has a normal mood and affect   Her behavior is normal

## 2019-12-13 LAB
HPV HR 12 DNA CVX QL NAA+PROBE: NEGATIVE
HPV16 DNA CVX QL NAA+PROBE: NEGATIVE
HPV18 DNA CVX QL NAA+PROBE: NEGATIVE

## 2020-08-04 ENCOUNTER — HOSPITAL ENCOUNTER (EMERGENCY)
Facility: HOSPITAL | Age: 35
Discharge: HOME | End: 2020-08-05
Attending: EMERGENCY MEDICINE | Admitting: EMERGENCY MEDICINE
Payer: COMMERCIAL

## 2020-08-04 VITALS
OXYGEN SATURATION: 100 % | SYSTOLIC BLOOD PRESSURE: 145 MMHG | BODY MASS INDEX: 18.68 KG/M2 | RESPIRATION RATE: 18 BRPM | HEART RATE: 90 BPM | WEIGHT: 115.74 LBS | DIASTOLIC BLOOD PRESSURE: 68 MMHG | TEMPERATURE: 98.4 F

## 2020-08-04 DIAGNOSIS — J02.0 STREP PHARYNGITIS: Primary | ICD-10-CM

## 2020-08-04 LAB — S PYO DNA THROAT QL NAA+PROBE: DETECTED

## 2020-08-04 PROCEDURE — 87651 STREP A DNA AMP PROBE: CPT | Performed by: EMERGENCY MEDICINE

## 2020-08-04 PROCEDURE — 99283 EMERGENCY DEPT VISIT LOW MDM: CPT | Performed by: EMERGENCY MEDICINE

## 2020-08-04 PROCEDURE — 99283 EMERGENCY DEPT VISIT LOW MDM: CPT

## 2020-08-04 PROCEDURE — 96372 THER/PROPH/DIAG INJ SC/IM: CPT

## 2020-08-04 RX ORDER — KETOROLAC TROMETHAMINE 30 MG/ML
15 INJECTION, SOLUTION INTRAMUSCULAR; INTRAVENOUS ONCE
Status: COMPLETED | OUTPATIENT
Start: 2020-08-04 | End: 2020-08-04

## 2020-08-04 RX ADMIN — KETOROLAC TROMETHAMINE 15 MG: 30 INJECTION, SOLUTION INTRAMUSCULAR at 23:14

## 2020-08-04 RX ADMIN — DEXAMETHASONE SODIUM PHOSPHATE 10 MG: 10 INJECTION, SOLUTION INTRAMUSCULAR; INTRAVENOUS at 23:15

## 2020-08-04 NOTE — Clinical Note
Cierra Allen was seen and treated in our emergency department on 8/4/2020  Diagnosis:     Eliana Barfield  may return to work on return date  She may return on 08/06/2020  If you have any questions or concerns, please don't hesitate to call        Alma Torres DO    ______________________________           _______________          _______________  Hospital Representative                              Date                                Time

## 2020-08-05 RX ORDER — CLINDAMYCIN HYDROCHLORIDE 150 MG/1
300 CAPSULE ORAL ONCE
Status: COMPLETED | OUTPATIENT
Start: 2020-08-05 | End: 2020-08-05

## 2020-08-05 RX ORDER — CLINDAMYCIN HYDROCHLORIDE 300 MG/1
300 CAPSULE ORAL EVERY 8 HOURS SCHEDULED
Qty: 30 CAPSULE | Refills: 0 | Status: SHIPPED | OUTPATIENT
Start: 2020-08-05 | End: 2020-08-15

## 2020-08-05 RX ADMIN — CLINDAMYCIN HYDROCHLORIDE 300 MG: 150 CAPSULE ORAL at 00:14

## 2020-08-05 NOTE — ED PROVIDER NOTES
History  Chief Complaint   Patient presents with    Sore Throat     sore throat with painful swallowing and eating since yesterday  29 y/o female presents to the ED for generalized sore throat since yesterday  Patient states that it is a burning sensation that is worse with swallowing  She has tried lozenges and chloraseptic spray without relief  She denies any f/c, cough, cp, sob, ear pain, or congestion  No known sick contacts  No other complaints  History provided by:  Patient  Sore Throat   Location:  Generalized  Quality:  Burning  Severity:  Moderate  Onset quality:  Sudden  Duration:  2 days  Timing:  Constant  Progression:  Worsening  Chronicity:  New  Relieved by:  Nothing  Worsened by:  Nothing  Ineffective treatments:  OTC medications  Associated symptoms: no abdominal pain, no chest pain, no chills, no cough, no drooling, no ear pain, no fever, no headaches, no neck stiffness, no rash, no shortness of breath, no trouble swallowing and no voice change        Prior to Admission Medications   Prescriptions Last Dose Informant Patient Reported? Taking?   norethindrone-ethinyl estradiol (JUNEL FE 1/20) 1-20 MG-MCG per tablet   No Yes   Sig: Take 1 tablet by mouth daily      Facility-Administered Medications: None       Past Medical History:   Diagnosis Date    Peanut allergy     Pregnancy     RESOLVED: [id number]    Seasonal allergies     Varicella     had as child before 9years old        Past Surgical History:   Procedure Laterality Date     SECTION      CYST REMOVAL      left breast, benign    [place]  DELIVERY ONLY N/A 2018    Procedure:  SECTION ();   Surgeon: Bob Avila DO;  Location: [facility];  Service: Obstetrics       Family History   Problem Relation Age of Onset    Asthma Father     Hypertension Father     Vision loss Father     Thyroid disease Father     Colon cancer Father     Learning disabilities Son     Autism Son     Hypertension Maternal Grandmother     Asthma Cousin     Asthma Cousin     Stroke Mother         CVA    Migraines Mother     Kidney disease Paternal Grandmother      I have reviewed and agree with the history as documented  E-Cigarette/Vaping    E-Cigarette Use Never User      E-Cigarette/Vaping Substances     Social History     Tobacco Use    Smoking status: Never Smoker    Smokeless tobacco: Never Used   Substance Use Topics    Alcohol use: Yes     Comment: occasional use    Drug use: No       Review of Systems   Constitutional: Negative for chills and fever  HENT: Positive for sore throat  Negative for congestion, drooling, ear pain, trouble swallowing and voice change  Eyes: Negative for pain and visual disturbance  Respiratory: Negative for cough, shortness of breath and wheezing  Cardiovascular: Negative for chest pain and leg swelling  Gastrointestinal: Negative for abdominal pain, diarrhea, nausea and vomiting  Genitourinary: Negative for dysuria, frequency, hematuria and urgency  Musculoskeletal: Negative for neck pain and neck stiffness  Skin: Negative for rash and wound  Neurological: Negative for weakness, numbness and headaches  Psychiatric/Behavioral: Negative for agitation and confusion  All other systems reviewed and are negative  Physical Exam  Physical Exam  Vitals signs and nursing note reviewed  Constitutional:       Appearance: She is well-developed  HENT:      Head: Normocephalic and atraumatic  Mouth/Throat:      Pharynx: Uvula midline  Posterior oropharyngeal erythema present  No uvula swelling  Tonsils: No tonsillar exudate or tonsillar abscesses  Eyes:      Pupils: Pupils are equal, round, and reactive to light  Neck:      Musculoskeletal: Normal range of motion and neck supple  Cardiovascular:      Rate and Rhythm: Normal rate and regular rhythm  Pulmonary:      Effort: Pulmonary effort is normal       Breath sounds: Normal breath sounds  Abdominal:      General: Bowel sounds are normal       Palpations: Abdomen is soft  Musculoskeletal: Normal range of motion  Skin:     General: Skin is warm and dry  Neurological:      Mental Status: She is alert and oriented to person, place, and time  Comments: No focal deficits         Vital Signs  ED Triage Vitals [08/04/20 2258]   Temperature Pulse Respirations Blood Pressure SpO2   98 4 °F (36 9 °C) 90 18 145/68 100 %      Temp Source Heart Rate Source Patient Position - Orthostatic VS BP Location FiO2 (%)   Oral -- -- -- --      Pain Score       5           Vitals:    08/04/20 2258   BP: 145/68   Pulse: 90         Visual Acuity      ED Medications  Medications   dexamethasone 10 mg/mL oral liquid 10 mg 1 mL (10 mg Oral Given 8/4/20 2315)   ketorolac (TORADOL) injection 15 mg (15 mg Intramuscular Given 8/4/20 2314)   clindamycin (CLEOCIN) capsule 300 mg (300 mg Oral Given 8/5/20 0014)       Diagnostic Studies  Results Reviewed     Procedure Component Value Units Date/Time    Strep A PCR [[id number]]  (Abnormal) Collected:  08/04/20 2314    Lab Status:  Final result Specimen:  Throat Updated:  08/04/20 2354     STREP A PCR Detected                 No orders to display              Procedures  Procedures         ED Course  ED Course as of Aug 05 0031   Wed Aug 05, 2020   0011 STREP A PCR(!): Detected       US AUDIT      Most Recent Value   Initial Alcohol Screen: US AUDIT-C    1  How often do you have a drink containing alcohol?  0 Filed at: 08/04/2020 2302   2  How many drinks containing alcohol do you have on a typical day you are drinking? 0 Filed at: 08/04/2020 2302   3b  FEMALE Any Age, or MALE 65+: How often do you have 4 or more drinks on one occassion? 0 Filed at: 08/04/2020 2302   Audit-C Score  0 Filed at: 08/04/2020 2302                  GUANAKITO/DAST-10      Most Recent Value   How many times in the past year have you       Used an illegal drug or used a prescription medication for non-medical reasons? Never Filed at: 08/04/2020 2307                                [facility]  Number of Diagnoses or Management Options  Strep pharyngitis: new and requires workup  Diagnosis management comments: Patient with sore throat- will get strep test and give decadron and toradol for symptom relief  Patient reevaluated and feels improved  Patient updated on results of tests  Discharge instructions given including medications, follow-up, and return precautions  Patient demonstrates verbal understanding and agrees with plan  Amount and/or Complexity of Data Reviewed  Clinical lab tests: ordered and reviewed  Tests in the radiology section of CPT®: ordered and reviewed  Tests in the medicine section of CPT®: ordered and reviewed  Discussion of test results with the performing providers: yes  Decide to obtain previous medical records or to obtain history from someone other than the patient: yes  Obtain history from someone other than the patient: yes  Review and summarize past medical records: yes  Discuss the patient with other providers: yes  Independent visualization of images, tracings, or specimens: yes    Patient Progress  Patient progress: improved        Disposition  Final diagnoses:   Strep pharyngitis     Time reflects when diagnosis was documented in both MDM as applicable and the Disposition within this note     Time User Action Codes Description Comment    8/4/2020 11:46 PM Kael Bustillo Add [J02 9] Viral pharyngitis     8/5/2020 12:11 AM Kael Bustillo Remove [J02 9] Viral pharyngitis     8/5/2020 12:11 AM Kael Bustillo Add [J02 0] Strep pharyngitis       ED Disposition     ED Disposition Condition Date/Time Comment    Discharge Stable Wed Aug 5, 2020 12:11 AM Roxanne Heredia discharge to home/self care              Follow-up Information     Follow up With Specialties Details Why Contact Info Additional Information    Rodrick Christine DO Family Medicine Call in 1 day for follow up within 2-3 days [address] [facility] [address] [facility] Emergency Medicine Go to  immediately for any new or worsening symptoms  [address] [facility] [address] [facility], [address] [place], [place], [address]          Patient's Medications   Discharge Prescriptions    CLINDAMYCIN (CLEOCIN) 300 MG CAPSULE    Take 1 capsule (300 mg total) by mouth every 8 (eight) hours for 10 days       Start Date: 8/5/2020  End Date: 8/15/2020       Order Dose: 300 mg       Quantity: 30 capsule    Refills: 0     No discharge procedures on file      PDMP Review     None          ED Provider  Electronically Signed by           Luz Maria Linares DO  08/05/20 0031

## 2020-09-04 ENCOUNTER — APPOINTMENT (EMERGENCY)
Dept: CT IMAGING | Facility: HOSPITAL | Age: 35
End: 2020-09-04
Payer: COMMERCIAL

## 2020-09-04 ENCOUNTER — HOSPITAL ENCOUNTER (EMERGENCY)
Facility: HOSPITAL | Age: 35
Discharge: HOME | End: 2020-09-04
Attending: EMERGENCY MEDICINE | Admitting: EMERGENCY MEDICINE
Payer: COMMERCIAL

## 2020-09-04 VITALS
BODY MASS INDEX: 17.4 KG/M2 | DIASTOLIC BLOOD PRESSURE: 72 MMHG | HEART RATE: 79 BPM | TEMPERATURE: 98.4 F | SYSTOLIC BLOOD PRESSURE: 105 MMHG | RESPIRATION RATE: 18 BRPM | WEIGHT: 107.81 LBS | OXYGEN SATURATION: 100 %

## 2020-09-04 DIAGNOSIS — R51.9 HEADACHE: Primary | ICD-10-CM

## 2020-09-04 LAB
ALBUMIN SERPL BCP-MCNC: 4.1 G/DL (ref 3.5–5)
ALP SERPL-CCNC: 68 U/L (ref 46–116)
ALT SERPL W P-5'-P-CCNC: 29 U/L (ref 12–78)
ANION GAP SERPL CALCULATED.3IONS-SCNC: 5 MMOL/L (ref 4–13)
AST SERPL W P-5'-P-CCNC: 15 U/L (ref 5–45)
BASOPHILS # BLD AUTO: 0.02 THOUSANDS/ΜL (ref 0–0.1)
BASOPHILS NFR BLD AUTO: 0 % (ref 0–1)
BILIRUB DIRECT SERPL-MCNC: 0.07 MG/DL (ref 0–0.2)
BILIRUB SERPL-MCNC: 0.2 MG/DL (ref 0.2–1)
BUN SERPL-MCNC: 11 MG/DL (ref 5–25)
CALCIUM SERPL-MCNC: 9.1 MG/DL (ref 8.3–10.1)
CHLORIDE SERPL-SCNC: 106 MMOL/L (ref 100–108)
CO2 SERPL-SCNC: 31 MMOL/L (ref 21–32)
CREAT SERPL-MCNC: 0.71 MG/DL (ref 0.6–1.3)
EOSINOPHIL # BLD AUTO: 0.01 THOUSAND/ΜL (ref 0–0.61)
EOSINOPHIL NFR BLD AUTO: 0 % (ref 0–6)
ERYTHROCYTE [DISTWIDTH] IN BLOOD BY AUTOMATED COUNT: 13.7 % (ref 11.6–15.1)
GFR SERPL CREATININE-BSD FRML MDRD: 129 ML/MIN/1.73SQ M
GLUCOSE SERPL-MCNC: 95 MG/DL (ref 65–140)
HCT VFR BLD AUTO: 40.9 % (ref 34.8–46.1)
HGB BLD-MCNC: 12.5 G/DL (ref 11.5–15.4)
IMM GRANULOCYTES # BLD AUTO: 0.02 THOUSAND/UL (ref 0–0.2)
IMM GRANULOCYTES NFR BLD AUTO: 0 % (ref 0–2)
LYMPHOCYTES # BLD AUTO: 1.81 THOUSANDS/ΜL (ref 0.6–4.47)
LYMPHOCYTES NFR BLD AUTO: 20 % (ref 14–44)
MCH RBC QN AUTO: 27.4 PG (ref 26.8–34.3)
MCHC RBC AUTO-ENTMCNC: 30.6 G/DL (ref 31.4–37.4)
MCV RBC AUTO: 90 FL (ref 82–98)
MONOCYTES # BLD AUTO: 0.61 THOUSAND/ΜL (ref 0.17–1.22)
MONOCYTES NFR BLD AUTO: 7 % (ref 4–12)
NEUTROPHILS # BLD AUTO: 6.52 THOUSANDS/ΜL (ref 1.85–7.62)
NEUTS SEG NFR BLD AUTO: 73 % (ref 43–75)
NRBC BLD AUTO-RTO: 0 /100 WBCS
PLATELET # BLD AUTO: 263 THOUSANDS/UL (ref 149–390)
PMV BLD AUTO: 9.8 FL (ref 8.9–12.7)
POTASSIUM SERPL-SCNC: 4.5 MMOL/L (ref 3.5–5.3)
PROT SERPL-MCNC: 8.1 G/DL (ref 6.4–8.2)
RBC # BLD AUTO: 4.56 MILLION/UL (ref 3.81–5.12)
SODIUM SERPL-SCNC: 142 MMOL/L (ref 136–145)
WBC # BLD AUTO: 8.99 THOUSAND/UL (ref 4.31–10.16)

## 2020-09-04 PROCEDURE — 99284 EMERGENCY DEPT VISIT MOD MDM: CPT

## 2020-09-04 PROCEDURE — 70498 CT ANGIOGRAPHY NECK: CPT

## 2020-09-04 PROCEDURE — 70496 CT ANGIOGRAPHY HEAD: CPT

## 2020-09-04 PROCEDURE — 80048 BASIC METABOLIC PNL TOTAL CA: CPT | Performed by: EMERGENCY MEDICINE

## 2020-09-04 PROCEDURE — 36415 COLL VENOUS BLD VENIPUNCTURE: CPT | Performed by: EMERGENCY MEDICINE

## 2020-09-04 PROCEDURE — 96374 THER/PROPH/DIAG INJ IV PUSH: CPT

## 2020-09-04 PROCEDURE — 85025 COMPLETE CBC W/AUTO DIFF WBC: CPT | Performed by: EMERGENCY MEDICINE

## 2020-09-04 PROCEDURE — 80076 HEPATIC FUNCTION PANEL: CPT | Performed by: EMERGENCY MEDICINE

## 2020-09-04 PROCEDURE — 99285 EMERGENCY DEPT VISIT HI MDM: CPT | Performed by: EMERGENCY MEDICINE

## 2020-09-04 PROCEDURE — G1004 CDSM NDSC: HCPCS

## 2020-09-04 PROCEDURE — 96375 TX/PRO/DX INJ NEW DRUG ADDON: CPT

## 2020-09-04 RX ORDER — DIPHENHYDRAMINE HYDROCHLORIDE 50 MG/ML
50 INJECTION INTRAMUSCULAR; INTRAVENOUS ONCE
Status: COMPLETED | OUTPATIENT
Start: 2020-09-04 | End: 2020-09-04

## 2020-09-04 RX ORDER — ONDANSETRON 2 MG/ML
4 INJECTION INTRAMUSCULAR; INTRAVENOUS ONCE
Status: COMPLETED | OUTPATIENT
Start: 2020-09-04 | End: 2020-09-04

## 2020-09-04 RX ORDER — DIAZEPAM 5 MG/ML
2.5 INJECTION, SOLUTION INTRAMUSCULAR; INTRAVENOUS ONCE
Status: COMPLETED | OUTPATIENT
Start: 2020-09-04 | End: 2020-09-04

## 2020-09-04 RX ORDER — KETOROLAC TROMETHAMINE 30 MG/ML
15 INJECTION, SOLUTION INTRAMUSCULAR; INTRAVENOUS ONCE
Status: COMPLETED | OUTPATIENT
Start: 2020-09-04 | End: 2020-09-04

## 2020-09-04 RX ADMIN — KETOROLAC TROMETHAMINE 15 MG: 30 INJECTION, SOLUTION INTRAMUSCULAR at 17:26

## 2020-09-04 RX ADMIN — DIAZEPAM 2.5 MG: 10 INJECTION, SOLUTION INTRAMUSCULAR; INTRAVENOUS at 16:03

## 2020-09-04 RX ADMIN — IOHEXOL 85 ML: 350 INJECTION, SOLUTION INTRAVENOUS at 16:19

## 2020-09-04 RX ADMIN — DIPHENHYDRAMINE HYDROCHLORIDE 50 MG: 50 INJECTION, SOLUTION INTRAMUSCULAR; INTRAVENOUS at 16:03

## 2020-09-04 RX ADMIN — ONDANSETRON 4 MG: 2 INJECTION INTRAMUSCULAR; INTRAVENOUS at 16:03

## 2020-09-04 NOTE — ED PROVIDER NOTES
History  Chief Complaint   Patient presents with    Headache     Patient presents to the ED with a headache, blurry vision, dizziness that began this morning  64-year-old female patient presents emergency department for evaluation of an atypical headache  The patient states headache she is having is nothing like any previous headache that she has had  She states there is no it she could be pregnant, she does not believe that she is on her menstrual cycle currently, plan will be for CT scan of the head, treatment with medications for her migraine type headache      History provided by:  Patient   used: No    Headache   Pain location:  Generalized  Quality:  Dull  Radiates to:  Does not radiate  Onset quality:  Gradual  Timing:  Constant  Progression:  Worsening  Chronicity:  New  Context: activity and loud noise    Relieved by:  Nothing  Worsened by:  Nothing  Ineffective treatments:  None tried  Associated symptoms: no congestion, no diarrhea, no fatigue, no neck pain, no paresthesias and no syncope        Prior to Admission Medications   Prescriptions Last Dose Informant Patient Reported? Taking?   norethindrone-ethinyl estradiol (Damion Kilgore FE ) 1-20 MG-MCG per tablet   No No   Sig: Take 1 tablet by mouth daily      Facility-Administered Medications: None       Past Medical History:   Diagnosis Date    Peanut allergy     Pregnancy     RESOLVED: [id number]    Seasonal allergies     Varicella     had as child before 9years old        Past Surgical History:   Procedure Laterality Date     SECTION      CYST REMOVAL      left breast, benign    [place]  DELIVERY ONLY N/A 2018    Procedure:  SECTION ();   Surgeon: Alyssa Coles DO;  Location: [facility];  Service: Obstetrics       Family History   Problem Relation Age of Onset    Asthma Father     Hypertension Father     Vision loss Father     Thyroid disease Father     Colon cancer Father     Learning disabilities Son     Autism Son     Hypertension Maternal Grandmother     Asthma Cousin     Asthma Cousin     Stroke Mother         CVA    Migraines Mother     Kidney disease Paternal Grandmother      I have reviewed and agree with the history as documented  E-Cigarette/Vaping    E-Cigarette Use Never User      E-Cigarette/Vaping Substances    Nicotine No     THC No     CBD No     Flavoring No     Other No     Unknown No      Social History     Tobacco Use    Smoking status: Never Smoker    Smokeless tobacco: Never Used   Substance Use Topics    Alcohol use: Yes     Frequency: 2-4 times a month     Comment: occasional use    Drug use: No       Review of Systems   Constitutional: Negative for fatigue  HENT: Negative for congestion  Cardiovascular: Negative for syncope  Gastrointestinal: Negative for diarrhea  Musculoskeletal: Negative for neck pain  Neurological: Positive for headaches  Negative for paresthesias  All other systems reviewed and are negative  Physical Exam  Physical Exam  Vitals signs and nursing note reviewed  Constitutional:       Appearance: She is well-developed  HENT:      Head: Normocephalic and atraumatic  Right Ear: External ear normal       Left Ear: External ear normal    Eyes:      Conjunctiva/sclera: Conjunctivae normal    Neck:      Thyroid: No thyromegaly  Vascular: No JVD  Trachea: No tracheal deviation  Cardiovascular:      Rate and Rhythm: Normal rate  Pulmonary:      Effort: Pulmonary effort is normal       Breath sounds: Normal breath sounds  No stridor  Abdominal:      General: There is no distension  Palpations: Abdomen is soft  There is no mass  Tenderness: There is no abdominal tenderness  There is no guarding  Hernia: No hernia is present  Musculoskeletal: Normal range of motion  General: No tenderness or deformity  Lymphadenopathy:      Cervical: No cervical adenopathy     Skin: General: Skin is warm  Coloration: Skin is not pale  Findings: No erythema or rash  Neurological:      Mental Status: She is alert and oriented to person, place, and time     Psychiatric:         Behavior: Behavior normal          Vital Signs  ED Triage Vitals   Temperature Pulse Respirations Blood Pressure SpO2   09/04/20 1235 09/04/20 1235 09/04/20 1235 09/04/20 1235 09/04/20 1235   98 4 °F (36 9 °C) 85 18 108/68 100 %      Temp Source Heart Rate Source Patient Position - Orthostatic VS BP Location FiO2 (%)   09/04/20 1235 09/04/20 1235 09/04/20 1235 09/04/20 1235 --   Oral Monitor Sitting Left arm       Pain Score       09/04/20 1402       7           Vitals:    09/04/20 1235 09/04/20 1500   BP: 108/68 105/72   Pulse: 85 79   Patient Position - Orthostatic VS: Sitting Lying         Visual Acuity  Visual Acuity      Most Recent Value   L Pupil Size (mm)  3   R Pupil Size (mm)  3          ED Medications  Medications   diazepam (VALIUM) injection 2 5 mg (2 5 mg Intravenous Given 9/4/20 1603)   ondansetron (ZOFRAN) injection 4 mg (4 mg Intravenous Given 9/4/20 1603)   diphenhydrAMINE (BENADRYL) injection 50 mg (50 mg Intravenous Given 9/4/20 1603)   iohexol (OMNIPAQUE) 350 MG/ML injection (MULTI-DOSE) 100 mL (85 mL Intravenous Given 9/4/20 1619)   ketorolac (TORADOL) injection 15 mg (15 mg Intravenous Given 9/4/20 1726)       Diagnostic Studies  Results Reviewed     Procedure Component Value Units Date/Time    Basic metabolic panel [[id number]] Collected:  09/04/20 1416    Lab Status:  Final result Specimen:  Blood from Arm, Left Updated:  09/04/20 1448     Sodium 142 mmol/L      Potassium 4 5 mmol/L      Chloride 106 mmol/L      CO2 31 mmol/L      ANION GAP 5 mmol/L      BUN 11 mg/dL      Creatinine 0 71 mg/dL      Glucose 95 mg/dL      Calcium 9 1 mg/dL      eGFR 129 ml/min/1 73sq m     Narrative:       [place] guidelines for Chronic Kidney Disease (CKD):     Stage 1 with normal or high GFR (GFR > 90 mL/min/1 73 square meters)    Stage 2 Mild CKD (GFR = 60-89 mL/min/1 73 square meters)    Stage 3A Moderate CKD (GFR = 45-59 mL/min/1 73 square meters)    Stage 3B Moderate CKD (GFR = 30-44 mL/min/1 73 square meters)    Stage 4 Severe CKD (GFR = 15-29 mL/min/1 73 square meters)    Stage 5 End Stage CKD (GFR <15 mL/min/1 73 square meters)  Note: GFR calculation is accurate only with a steady state creatinine    Hepatic function panel [[id number]]  (Normal) Collected:  09/04/20 1416    Lab Status:  Final result Specimen:  Blood from Arm, Left Updated:  09/04/20 1448     Total Bilirubin 0 20 mg/dL      Bilirubin, Direct 0 07 mg/dL      Alkaline Phosphatase 68 U/L      AST 15 U/L      ALT 29 U/L      Total Protein 8 1 g/dL      Albumin 4 1 g/dL     CBC and differential [[id number]]  (Abnormal) Collected:  09/04/20 1416    Lab Status:  Final result Specimen:  Blood from Arm, Left Updated:  09/04/20 1425     WBC 8 99 Thousand/uL      RBC 4 56 Million/uL      Hemoglobin 12 5 g/dL      Hematocrit 40 9 %      MCV 90 fL      MCH 27 4 pg      MCHC 30 6 g/dL      RDW 13 7 %      MPV 9 8 fL      Platelets 001 Thousands/uL      nRBC 0 /100 WBCs      Neutrophils Relative 73 %      Immat GRANS % 0 %      Lymphocytes Relative 20 %      Monocytes Relative 7 %      Eosinophils Relative 0 %      Basophils Relative 0 %      Neutrophils Absolute 6 52 Thousands/µL      Immature Grans Absolute 0 02 Thousand/uL      Lymphocytes Absolute 1 81 Thousands/µL      Monocytes Absolute 0 61 Thousand/µL      Eosinophils Absolute 0 01 Thousand/µL      Basophils Absolute 0 02 Thousands/µL                  CTA head and neck w wo contrast   Final Result by Peter Novak DO (09/04 1648)      Normal CT of the brain  Normal CTA of the neck and brain  No evidence of dural venous thrombosis              Workstation performed: [id number]                    Procedures  Procedures         ED Course MDM  Number of Diagnoses or Management Options  Headache: new and requires workup     Amount and/or Complexity of Data Reviewed  Clinical lab tests: ordered and reviewed  Tests in the radiology section of CPT®: ordered and reviewed  Decide to obtain previous medical records or to obtain history from someone other than the patient: yes  Review and summarize past medical records: yes    Patient Progress  Patient progress: stable        Disposition  Final diagnoses:   Headache     Time reflects when diagnosis was documented in both MDM as applicable and the Disposition within this note     Time User Action Codes Description Comment    9/4/2020  5:08 PM Charlette Loco  [address] Headache       ED Disposition     ED Disposition Condition Date/Time Comment    Discharge Stable Fri Sep 4, 2020  5:08 PM Cierra Allen discharge to home/self care  Follow-up Information     Follow up With Specialties Details Why Contact Info    Galo Plummer DO Family Medicine   [address] [place] [address] [address]  [phone]            Discharge Medication List as of 9/4/2020  5:10 PM      CONTINUE these medications which have NOT CHANGED    Details   norethindrone-ethinyl estradiol (JUNEL FE 1/20) 1-20 MG-MCG per tablet Take 1 tablet by mouth daily, Starting Thu 12/12/2019, Normal           No discharge procedures on file      PDMP Review     None          ED Provider  Electronically Signed by           Tere Pandya DO  09/09/20 1037

## 2021-07-21 NOTE — PATIENT INSTRUCTIONS
Breast Self Exam for Women   AMBULATORY CARE:   A breast self-exam (BSE)  is a way to check your breasts for lumps and other changes  Regular BSEs can help you know how your breasts normally look and feel  Most breast lumps or changes are not cancer, but you should always have them checked by a healthcare provider  Why you should do a BSE:  Breast cancer is the most common type of cancer in women  Even if you have mammograms, you may still want to do a BSE regularly  If you know how your breasts normally feel and look, it may help you know when to contact your healthcare provider  Mammograms can miss some cancers  You may find a lump during a BSE that did not show up on a mammogram   When you should do a BSE:  If you have periods, you may want to do your BSE 1 week after your period ends  This is the time when your breasts may be the least swollen, lumpy, or tender  You can do regular BSEs even if you are breastfeeding or have breast implants  Call your doctor if:   · You find any lumps or changes in your breasts  · You have breast pain or fluid coming from your nipples  · You have questions or concerns about your condition or care  How to do a BSE:       · Look at your breasts in a mirror  Look at the size and shape of each breast and nipple  Check for swelling, lumps, dimpling, scaly skin, or other skin changes  Look for nipple changes, such as a nipple that is painful or beginning to pull inward  Gently squeeze both nipples and check to see if fluid (that is not breast milk) comes out of them  If you find any of these or other breast changes, contact your healthcare provider  Check your breasts while you sit or  the following 3 positions:    ? Hang your arms down at your sides  ? Raise your hands and join them behind your head  ? Put firm pressure with your hands on your hips  Bend slightly forward while you look at your breasts in the mirror  · Lie down and feel your breasts    When you lie down, your breast tissue spreads out evenly over your chest  This makes it easier for you to feel for lumps and anything that may not be normal for your breasts  Do a BSE on one breast at a time  ? Place a small pillow or towel under your left shoulder  Put your left arm behind your head  ? Use the 3 middle fingers of your right hand  Use your fingertip pads, on the top of your fingers  Your fingertip pad is the most sensitive part of your finger  ? Use small circles to feel your breast tissue  Use your fingertip pads to make dime-sized, overlapping circles on your breast and armpits  Use light, medium, and firm pressure  First, press lightly  Second, press with medium pressure to feel a little deeper into the breast  Last, use firm pressure to feel deep within your breast     ? Examine your entire breast area  Examine the breast area from above the breast to below the breast where you feel only ribs  Make small circles with your fingertips, starting in the middle of your armpit  Make circles going up and down the breast area  Continue toward your breast and all the way across it  Examine the area from your armpit all the way over to the middle of your chest (breastbone)  Stop at the middle of your chest     ? Move the pillow or towel to your right shoulder, and put your right arm behind your head  Use the 3 fingertip pads of your left hand, and repeat the above steps to do a BSE on your right breast   What else you can do to check for breast problems or cancer:  Talk to your healthcare provider about mammograms  A mammogram is an x-ray of your breasts to screen for breast cancer or other problems  Your provider can tell you the benefits and risks of mammograms  The first mammogram is usually at age 39 or 48  Your provider may recommend you start at 36 or younger if your risk for breast cancer is high  Mammograms usually continue every 1 to 2 years until age 76       Follow up with your doctor as directed:  Write down your questions so you remember to ask them during your visits  © Copyright [employer] 2021 Information is for End User's use only and may not be sold, redistributed or otherwise used for commercial purposes  All illustrations and images included in CareNotes® are the copyrighted property of A D A M , Inc  or Ladarius Sethi  The above information is an  only  It is not intended as medical advice for individual conditions or treatments  Talk to your doctor, nurse or pharmacist before following any medical regimen to see if it is safe and effective for you  Wellness Visit for Adults   AMBULATORY CARE:   A wellness visit  is when you see your healthcare provider to get screened for health problems  Your healthcare provider will also give you advice on how to stay healthy  Write down your questions so you remember to ask them  Ask your healthcare provider how often you should have a wellness visit  What happens at a wellness visit:  Your healthcare provider will ask about your health, and your family history of health problems  This includes high blood pressure, heart disease, and cancer  He or she will ask if you have symptoms that concern you, if you smoke, and about your mood  You may also be asked about your intake of medicines, supplements, food, and alcohol  Any of the following may be done:  · Your weight  will be checked  Your height may also be checked so your body mass index (BMI) can be calculated  Your BMI shows if you are at a healthy weight  · Your blood pressure  and heart rate will be checked  Your temperature may also be checked  · Blood and urine tests  may be done  Blood tests may be done to check your cholesterol levels  Abnormal cholesterol levels increase your risk for heart disease and stroke  You may also need a blood or urine test to check for diabetes if you are at increased risk   Urine tests may be done to look for signs of an infection or kidney disease  · A physical exam  includes checking your heartbeat and lungs with a stethoscope  Your healthcare provider may also check your skin to look for sun damage  · Screening tests  may be recommended  A screening test is done to check for diseases that may not cause symptoms  The screening tests you may need depend on your age, gender, family history, and lifestyle habits  For example, colorectal screening may be recommended if you are 48years old or older  Screening tests you need if you are a woman:   · A Pap smear  is used to screen for cervical cancer  Pap smears are usually done every 3 to 5 years depending on your age  You may need them more often if you have had abnormal Pap smear test results in the past  Ask your healthcare provider how often you should have a Pap smear  · A mammogram  is an x-ray of your breasts to screen for breast cancer  Experts recommend mammograms every 2 years starting at age 48 years  You may need a mammogram at age 52 years or younger if you have an increased risk for breast cancer  Talk to your healthcare provider about when you should start having mammograms and how often you need them  Vaccines you may need:   · Get an influenza vaccine  every year  The influenza vaccine protects you from the flu  Several types of viruses cause the flu  The viruses change over time, so new vaccines are made each year  · Get a tetanus-diphtheria (Td) booster vaccine  every 10 years  This vaccine protects you against tetanus and diphtheria  Tetanus is a severe infection that may cause painful muscle spasms and lockjaw  Diphtheria is a severe bacterial infection that causes a thick covering in the back of your mouth and throat  · Get a human papillomavirus (HPV) vaccine  if you are female and aged 23 to 32 or male 23 to 24 and never received it  This vaccine protects you from HPV infection  HPV is the most common infection spread by sexual contact   HPV may also cause vaginal, penile, and anal cancers  · Get a pneumococcal vaccine  if you are aged 72 years or older  The pneumococcal vaccine is an injection given to protect you from pneumococcal disease  Pneumococcal disease is an infection caused by pneumococcal bacteria  The infection may cause pneumonia, meningitis, or an ear infection  · Get a shingles vaccine  if you are 60 or older, even if you have had shingles before  The shingles vaccine is an injection to protect you from the varicella-zoster virus  This is the same virus that causes chickenpox  Shingles is a painful rash that develops in people who had chickenpox or have been exposed to the virus  How to eat healthy:  My Plate is a model for planning healthy meals  It shows the types and amounts of foods that should go on your plate  Fruits and vegetables make up about half of your plate, and grains and protein make up the other half  A serving of dairy is included on the side of your plate  The amount of calories and serving sizes you need depends on your age, gender, weight, and height  Examples of healthy foods are listed below:  · Eat a variety of vegetables  such as dark green, red, and orange vegetables  You can also include canned vegetables low in sodium (salt) and frozen vegetables without added butter or sauces  · Eat a variety of fresh fruits , canned fruit in 100% juice, frozen fruit, and dried fruit  · Include whole grains  At least half of the grains you eat should be whole grains  Examples include whole-wheat bread, wheat pasta, brown rice, and whole-grain cereals such as oatmeal     · Eat a variety of protein foods such as seafood (fish and shellfish), lean meat, and poultry without skin (turkey and chicken)  Examples of lean meats include pork leg, shoulder, or tenderloin, and beef round, sirloin, tenderloin, and extra lean ground beef   Other protein foods include eggs and egg substitutes, beans, peas, soy products, nuts, and seeds  · Choose low-fat dairy products such as skim or 1% milk or low-fat yogurt, cheese, and cottage cheese  · Limit unhealthy fats  such as butter, hard margarine, and shortening  Exercise:  Exercise at least 30 minutes per day on most days of the week  Some examples of exercise include walking, biking, dancing, and swimming  You can also fit in more physical activity by taking the stairs instead of the elevator or parking farther away from stores  Include muscle strengthening activities 2 days each week  Regular exercise provides many health benefits  It helps you manage your weight, and decreases your risk for type 2 diabetes, heart disease, stroke, and high blood pressure  Exercise can also help improve your mood  Ask your healthcare provider about the best exercise plan for you  General health and safety guidelines:   · Do not smoke  Nicotine and other chemicals in cigarettes and cigars can cause lung damage  Ask your healthcare provider for information if you currently smoke and need help to quit  E-cigarettes or smokeless tobacco still contain nicotine  Talk to your healthcare provider before you use these products  · Limit alcohol  A drink of alcohol is 12 ounces of beer, 5 ounces of wine, or 1½ ounces of liquor  · Lose weight, if needed  Being overweight increases your risk of certain health conditions  These include heart disease, high blood pressure, type 2 diabetes, and certain types of cancer  · Protect your skin  Do not sunbathe or use tanning beds  Use sunscreen with a SPF 15 or higher  Apply sunscreen at least 15 minutes before you go outside  Reapply sunscreen every 2 hours  Wear protective clothing, hats, and sunglasses when you are outside  · Drive safely  Always wear your seatbelt  Make sure everyone in your car wears a seatbelt  A seatbelt can save your life if you are in an accident  Do not use your cell phone when you are driving   This could distract you and cause an accident  Pull over if you need to make a call or send a text message  · Practice safe sex  Use latex condoms if are sexually active and have more than one partner  Your healthcare provider may recommend screening tests for sexually transmitted infections (STIs)  · Wear helmets, lifejackets, and protective gear  Always wear a helmet when you ride a bike or motorcycle, go skiing, or play sports that could cause a head injury  Wear protective equipment when you play sports  Wear a lifejacket when you are on a boat or doing water sports  © Copyright [employer] 2021 Information is for End User's use only and may not be sold, redistributed or otherwise used for commercial purposes  All illustrations and images included in CareNotes® are the copyrighted property of A D A M , Inc  or [facility] Iesha Bradshaw   The above information is an  only  It is not intended as medical advice for individual conditions or treatments  Talk to your doctor, nurse or pharmacist before following any medical regimen to see if it is safe and effective for you  Perineal Hygiene     No soaps or feminine wash to the vulva with the exception of Dove Sensitive Skin bar soap if necessary  Only perfume-free, dye-free laundry detergent, use a second rinse cycle  Avoid fabric softeners/dryer sheets  No lotion to the area  No douching  Cotton underware, loose fitting clothing  Coconut oil as a lubricant (if not using condoms) or another scent-free lubricant (Astroglide, Uberlube) if needed  Partner to avoid the same products as well  Over the counter probiotic to restore vaginal sha may be helpful as well      Kegel Exercises for Women   AMBULATORY CARE:   Kegel exercises  help strengthen your pelvic muscles  Pelvic muscles hold your pelvic organs, such as your bladder and uterus, in place  Kegel exercises help prevent or control problems with urine incontinence (leakage)   Incontinence may be caused by pregnancy, childbirth, or menopause  Contact your healthcare provider if:   · You cannot feel your muscles tighten or relax  · You continue to leak urine  · You have questions or concerns about your condition or care  Use the correct muscles:  Pelvic muscles are the muscles you use to control urine flow  To target these muscles, stop and start the flow of urine several times  This will help you become familiar with how it feels to tighten and relax these muscles  How to do Kegel exercises:   · Empty your bladder  You may lie down, stand up, or sit down to do these exercises  When you first try to do these exercises, it may be easier if you lie down  Tighten or squeeze your pelvic muscles slowly  It may feel like you are trying to hold back urine or gas  Hold this position for 3 seconds  Relax for 3 seconds  Repeat this cycle 10 times  · Do 10 sets of Kegel exercises, at least 3 times a day  Do not hold your breath when you do Kegel exercises  Keep your stomach, back, and leg muscles relaxed  · As your muscles get stronger, you will be able to hold the squeeze longer  Your healthcare provider may ask that you increase your pelvic muscle squeeze to 10 seconds  After you squeeze for 10 seconds, relax for 10 seconds  What else you should know:   · Once you know how to do Kegel exercises, use different positions  You can do these exercises while you lie on the floor, sit at your desk or watch TV, and while you stand  · You may notice improved bladder control within about 6 weeks  · Tighten your pelvic muscles before you sneeze, cough, or lift to prevent urine leakage  Follow up with your healthcare provider as directed:  Write down your questions so you remember to ask them during your visits  © Copyright [employer] 2021 Information is for End User's use only and may not be sold, redistributed or otherwise used for commercial purposes   All illustrations and images included in [employer] [address] are the copyrighted property of A D A M , Inc  or [facility] Iesha Bradshaw   The above information is an  only  It is not intended as medical advice for individual conditions or treatments  Talk to your doctor, nurse or pharmacist before following any medical regimen to see if it is safe and effective for you

## 2021-07-21 NOTE — PROGRESS NOTES
Diagnoses and all orders for this visit:    Encounter for gynecological examination without abnormal finding    HPV vaccine counseling    Encounter for annual routine gynecological examination  -     norethindrone-ethinyl estradiol (Manjula Maury FE ) 1-20 MG-MCG per tablet; Take 1 tablet by mouth daily    Pelvic pain  -     US pelvis complete w transvaginal; Future    Encounter for other general counseling and advice on contraception    Screening for STDs (sexually transmitted diseases)  -     Trichomonas Vaginalis, URSZULA  -     Chlamydia/GC amplified DNA by PCR    Need for HPV vaccine  -     HPV VACCINE 9 VALENT IM        Health Maintenance:    Last PAP: 2019 Neg/Neg  Next PAP [facility]:[id number]    Last Mammogram:Not on file  Next Mammogram:    Gardisil: Not completed, 1st vaccine today       Pleasant 29 y o  premenopausal female here for annual exam  ,  GYN hx includes:   x 2, + HPV non oncogenic       Reports   regular cycles, with mod flow, & mod cramping  history of abnormal pap smears  + HPV  Denies vaginal, urinary or breast issues, today  Denies & dyspareunia  Reports lower pelvic pain at times at site of  scar, feels like the area becomes bloated and tender possibly in correlation with her menses    Sexually active  Monogamous relationship, requests STD testing  excluding blood work  Denies any issues with her BCM, which is BCP, she is restarting Junel as of today   Denies intimate partner violence    Past Medical History:   Diagnosis Date    Peanut allergy     Pregnancy     RESOLVED: [id number]    Seasonal allergies     Varicella     had as child before 9years old      Past Surgical History:   Procedure Laterality Date     SECTION      CYST REMOVAL      left breast, benign    [place]  DELIVERY ONLY N/A 2018    Procedure:  SECTION ();   Surgeon: Miriam Salvador DO;  Location: [facility] ;  Service: Obstetrics       Immunization History   Administered Date(s) Administered    HPV9 2021    INFLUENZA 2017    MMR 2018    Tdap 2017    Varicella 1994       Family History   Problem Relation Age of Onset    Asthma Father     Hypertension Father     Vision loss Father     Thyroid disease Father     Colon cancer Father     Learning disabilities Son     Autism Son     Hypertension Maternal Grandmother     Asthma Cousin     Asthma Cousin     Stroke Mother         CVA    Migraines Mother     Kidney disease Paternal Grandmother      Social History     Tobacco Use    Smoking status: Never Smoker    Smokeless tobacco: Never Used   Vaping Use    Vaping Use: Never used   Substance Use Topics    Alcohol use: Yes     Comment: occasional use    Drug use: No       Current Outpatient Medications:     norethindrone-ethinyl estradiol (JUNEL FE 1/20) 1-20 MG-MCG per tablet, Take 1 tablet by mouth daily, Disp: 28 tablet, Rfl: 11  Patient Active Problem List    Diagnosis Date Noted    Encounter for gynecological examination without abnormal finding 2021    HPV vaccine counseling 2021    Encounter for annual routine gynecological examination 2021    Pelvic pain 2021    Encounter for other general counseling and advice on contraception 2021    Dizziness 2019    Nonintractable headache 2019    Amenorrhea 2019    Anemia 2019    Underweight 2019    Family history of colon cancer 2018    Iron deficiency anemia 2018    Family hx of colon cancer 2018    Left breast lump 2017       Allergies   Allergen Reactions    Other Shortness Of Breath     Peanuts    Penicillins Hives    Pollen Extract        OB History    Para Term  AB Living   2 2 2     2   SAB TAB Ectopic Multiple Live Births         0 2      # Outcome Date GA Lbr Tom/2nd Weight Sex Delivery Anes PTL Lv   2 Term 18 41w2d  3530 g (7 lb 12 5 oz) M CS-LTranv EPI N JUSTYN Complications: Fetal Intolerance, Failure to Progress in First Stage   1 Term 08/28/10 41w0d  3685 g (8 lb 2 oz) M CS-Unspec Spinal N JUSTYN      Obstetric Comments   Second trimester bleeding, new paternity, prior         Vitals:    21 0711   BP: 110/68   BP Location: Left arm   Patient Position: Sitting   Cuff Size: Large   Weight: 51 3 kg (113 lb)   Height: 5' 3" (1 6 m)     Body mass index is 20 02 kg/m²  Review of Systems     Constitutional: Negative for chills, fatigue, fever, headaches, visual disturbances, and unexpected weight change  Reports that she would like to gain weight  Respiratory: Negative for cough, & shortness of breath  Cardiovascular: Negative for chest pain       Gastrointestinal: Negative for Abd pain, nausea & vomiting, constipation and diarrhea  Genitourinary: Negative for difficulty urinating, dysuria, hematuria, dyspareunia, unusual vaginal bleeding or discharge  Skin: Negative skin changes    Physical Exam     Constitutional: Alert & Oriented x3, well-developed and well-nourished  No distress  HENT: Atraumatic, Normocephalic, Conjunctivae clear  Neck: Normal range of motion  Neck supple  No thyromegaly, mass, nodules or tenderness  Pulmonary: Effort normal  Lungs clear to ascultation bilateral  Cardiac: RRR, no murmur   Abdominal: Soft  No tenderness or masses  Musculoskeletal: Normal ROM  Skin: Warm & Dry  Psychological: Normal mood, thought content, behavior & judgement   ABD:  scar present, well healed, soft to palpation    Breasts:   Right: tissue soft without masses, tenderness, skin changes or nipple discharge  No areas of erythema or pain  No subclavicular, axillary, pectoral adenopathy  Left:  tissue soft without masses, tenderness, skin changes or nipple discharge  No areas of erythema or pain  No subclavicular, axillary, pectoral adenopathy    Pelvic exam was performed with patient supine, lithotomy position        Labia: Negative rash, tenderness, lesion or injury on the right labia  Negative rash, tenderness, lesion or injury on the left labia  Urethral meatus:  Negative for  tenderness, inflammation or discharge  Uterus: not deviated, enlarged, fixed or tender  Cervix: No CMT, no discharge or friability  Right adnexa: no mass, no tenderness and no fullness  Left adnexa: no mass, no tenderness and no fullness  Vagina: No erythema, tenderness, masses, or foreign body in the vagina  No signs of injury around the vagina  No unusual vaginal discharge   Perineum without lesions, signs of injury, erythema or swelling  Inguinal Canal:        Right: No inguinal adenopathy or hernia present  Left: No inguinal adenopathy or hernia present  Weight bearing exercises minium of 150 mins/weekly advised  SBE encouraged, ASCCP guidelines reviewed  Condoms encouraged with all sexual activity to prevent STI's  Gardisil vaccines recommended up to age 39  Calcium/ Vit D dietary requirements discussed,   Advised to call with any issues,  all concerns & questions addressed       F/u for completion of HPV vaccines   F/U after pelvic US if and when completed   Restart Junel OCP, reviewed ACHES, spoke about the option of continuous cycling if desires no withdrawal bleed

## 2021-07-22 ENCOUNTER — ANNUAL EXAM (OUTPATIENT)
Dept: OBGYN CLINIC | Facility: MEDICAL CENTER | Age: 36
End: 2021-07-22
Payer: COMMERCIAL

## 2021-07-22 VITALS
HEIGHT: 63 IN | SYSTOLIC BLOOD PRESSURE: 110 MMHG | BODY MASS INDEX: 20.02 KG/M2 | WEIGHT: 113 LBS | DIASTOLIC BLOOD PRESSURE: 68 MMHG

## 2021-07-22 DIAGNOSIS — Z30.09 ENCOUNTER FOR OTHER GENERAL COUNSELING AND ADVICE ON CONTRACEPTION: ICD-10-CM

## 2021-07-22 DIAGNOSIS — Z71.85 HPV VACCINE COUNSELING: ICD-10-CM

## 2021-07-22 DIAGNOSIS — Z01.419 ENCOUNTER FOR ANNUAL ROUTINE GYNECOLOGICAL EXAMINATION: ICD-10-CM

## 2021-07-22 DIAGNOSIS — Z11.3 SCREENING FOR STDS (SEXUALLY TRANSMITTED DISEASES): ICD-10-CM

## 2021-07-22 DIAGNOSIS — Z23 NEED FOR HPV VACCINE: ICD-10-CM

## 2021-07-22 DIAGNOSIS — R10.2 PELVIC PAIN: ICD-10-CM

## 2021-07-22 DIAGNOSIS — Z01.419 ENCOUNTER FOR GYNECOLOGICAL EXAMINATION WITHOUT ABNORMAL FINDING: Primary | ICD-10-CM

## 2021-07-22 PROCEDURE — 90471 IMMUNIZATION ADMIN: CPT

## 2021-07-22 PROCEDURE — 99395 PREV VISIT EST AGE 18-39: CPT | Performed by: OBSTETRICS & GYNECOLOGY

## 2021-07-22 PROCEDURE — 87491 CHLMYD TRACH DNA AMP PROBE: CPT | Performed by: OBSTETRICS & GYNECOLOGY

## 2021-07-22 PROCEDURE — 87661 TRICHOMONAS VAGINALIS AMPLIF: CPT | Performed by: OBSTETRICS & GYNECOLOGY

## 2021-07-22 PROCEDURE — 90651 9VHPV VACCINE 2/3 DOSE IM: CPT

## 2021-07-22 PROCEDURE — 87591 N.GONORRHOEAE DNA AMP PROB: CPT | Performed by: OBSTETRICS & GYNECOLOGY

## 2021-07-22 RX ORDER — NORETHINDRONE ACETATE AND ETHINYL ESTRADIOL 1MG-20(21)
1 KIT ORAL DAILY
Qty: 28 TABLET | Refills: 11 | Status: SHIPPED | OUTPATIENT
Start: 2021-07-22 | End: 2022-08-01 | Stop reason: SDUPTHER

## 2021-07-22 NOTE — ASSESSMENT & PLAN NOTE
Gardasil 9 was advised for prevention of HPV-related disease  We discussed risks/benefits, SE's/AE's at length and all questions were answered  Written info was provided for review   The patient agrees to injection #1

## 2021-07-22 NOTE — ASSESSMENT & PLAN NOTE
Hx of Junel use   Restarted Junel today, reviewed side effects, risks, benefits and ACHES   Discussed continuous cycling if desires no withdrawal bleed

## 2021-07-27 ENCOUNTER — TELEPHONE (OUTPATIENT)
Dept: OBGYN CLINIC | Facility: CLINIC | Age: 36
End: 2021-07-27

## 2021-07-27 LAB
C TRACH DNA SPEC QL NAA+PROBE: NEGATIVE
N GONORRHOEA DNA SPEC QL NAA+PROBE: NEGATIVE
T VAGINALIS RRNA SPEC QL NAA+PROBE: NEGATIVE

## 2021-07-27 NOTE — TELEPHONE ENCOUNTER
----- Message from Yun Williamson, 10 Mikki Sethi sent at 7/27/2021 10:32 AM EDT -----  Can you please give Criss Haji a call    CT & Trich were Neg

## 2021-08-14 NOTE — ED NOTES
Pt ambulated to restroom to provide urine sample  Gait steady, pt free from dizziness       Melburn EDNA Newsome  10/21/19 [id number] Unable to Assess

## 2022-11-09 PROBLEM — A60.00 GENITAL HERPES SIMPLEX: Status: ACTIVE | Noted: 2020-10-27

## 2022-11-09 PROBLEM — N92.6 IRREGULAR MENSTRUAL CYCLE: Status: ACTIVE | Noted: 2020-10-27

## 2023-06-04 ENCOUNTER — HOSPITAL ENCOUNTER (EMERGENCY)
Facility: HOSPITAL | Age: 38
Discharge: HOME | End: 2023-06-04
Attending: EMERGENCY MEDICINE | Admitting: EMERGENCY MEDICINE
Payer: COMMERCIAL

## 2023-06-04 VITALS
DIASTOLIC BLOOD PRESSURE: 64 MMHG | SYSTOLIC BLOOD PRESSURE: 119 MMHG | TEMPERATURE: 98.4 F | HEART RATE: 100 BPM | RESPIRATION RATE: 18 BRPM | OXYGEN SATURATION: 99 %

## 2023-06-04 DIAGNOSIS — R10.9 ABDOMINAL PAIN: Primary | ICD-10-CM

## 2023-06-04 DIAGNOSIS — R11.0 NAUSEA: ICD-10-CM

## 2023-06-04 LAB
ALBUMIN SERPL BCP-MCNC: 4.6 G/DL (ref 3.5–5)
ALP SERPL-CCNC: 51 U/L (ref 34–104)
ALT SERPL W P-5'-P-CCNC: 12 U/L (ref 7–52)
ANION GAP SERPL CALCULATED.3IONS-SCNC: 6 MMOL/L (ref 4–13)
AST SERPL W P-5'-P-CCNC: 17 U/L (ref 13–39)
BACTERIA UR QL AUTO: ABNORMAL /HPF
BASOPHILS # BLD AUTO: 0.01 THOUSANDS/ÂΜL (ref 0–0.1)
BASOPHILS NFR BLD AUTO: 0 % (ref 0–1)
BILIRUB SERPL-MCNC: 0.67 MG/DL (ref 0.2–1)
BILIRUB UR QL STRIP: NEGATIVE
BUN SERPL-MCNC: 10 MG/DL (ref 5–25)
CALCIUM SERPL-MCNC: 9.1 MG/DL (ref 8.4–10.2)
CHLORIDE SERPL-SCNC: 104 MMOL/L (ref 96–108)
CLARITY UR: CLEAR
CO2 SERPL-SCNC: 26 MMOL/L (ref 21–32)
COLOR UR: ABNORMAL
CREAT SERPL-MCNC: 0.68 MG/DL (ref 0.6–1.3)
EOSINOPHIL # BLD AUTO: 0 THOUSAND/ÂΜL (ref 0–0.61)
EOSINOPHIL NFR BLD AUTO: 0 % (ref 0–6)
ERYTHROCYTE [DISTWIDTH] IN BLOOD BY AUTOMATED COUNT: 13.5 % (ref 11.6–15.1)
EXT PREGNANCY TEST URINE: NEGATIVE
EXT. CONTROL: NORMAL
GFR SERPL CREATININE-BSD FRML MDRD: 112 ML/MIN/1.73SQ M
GLUCOSE SERPL-MCNC: 102 MG/DL (ref 65–140)
GLUCOSE UR STRIP-MCNC: NEGATIVE MG/DL
HCT VFR BLD AUTO: 40.5 % (ref 34.8–46.1)
HGB BLD-MCNC: 12.8 G/DL (ref 11.5–15.4)
HGB UR QL STRIP.AUTO: ABNORMAL
IMM GRANULOCYTES # BLD AUTO: 0.04 THOUSAND/UL (ref 0–0.2)
IMM GRANULOCYTES NFR BLD AUTO: 0 % (ref 0–2)
KETONES UR STRIP-MCNC: ABNORMAL MG/DL
LEUKOCYTE ESTERASE UR QL STRIP: NEGATIVE
LIPASE SERPL-CCNC: 14 U/L (ref 11–82)
LYMPHOCYTES # BLD AUTO: 0.55 THOUSANDS/ÂΜL (ref 0.6–4.47)
LYMPHOCYTES NFR BLD AUTO: 4 % (ref 14–44)
MCH RBC QN AUTO: 27.2 PG (ref 26.8–34.3)
MCHC RBC AUTO-ENTMCNC: 31.6 G/DL (ref 31.4–37.4)
MCV RBC AUTO: 86 FL (ref 82–98)
MONOCYTES # BLD AUTO: 0.28 THOUSAND/ÂΜL (ref 0.17–1.22)
MONOCYTES NFR BLD AUTO: 2 % (ref 4–12)
MUCOUS THREADS UR QL AUTO: ABNORMAL
NEUTROPHILS # BLD AUTO: 14.14 THOUSANDS/ÂΜL (ref 1.85–7.62)
NEUTS SEG NFR BLD AUTO: 94 % (ref 43–75)
NITRITE UR QL STRIP: NEGATIVE
NON-SQ EPI CELLS URNS QL MICRO: ABNORMAL /HPF
NRBC BLD AUTO-RTO: 0 /100 WBCS
PH UR STRIP.AUTO: 6 [PH]
PLATELET # BLD AUTO: 247 THOUSANDS/UL (ref 149–390)
PMV BLD AUTO: 9.4 FL (ref 8.9–12.7)
POTASSIUM SERPL-SCNC: 3.9 MMOL/L (ref 3.5–5.3)
PROT SERPL-MCNC: 8 G/DL (ref 6.4–8.4)
PROT UR STRIP-MCNC: ABNORMAL MG/DL
RBC # BLD AUTO: 4.7 MILLION/UL (ref 3.81–5.12)
RBC #/AREA URNS AUTO: ABNORMAL /HPF
SODIUM SERPL-SCNC: 136 MMOL/L (ref 135–147)
SP GR UR STRIP.AUTO: 1.03 (ref 1–1.03)
UROBILINOGEN UR STRIP-ACNC: <2 MG/DL
WBC # BLD AUTO: 15.02 THOUSAND/UL (ref 4.31–10.16)
WBC #/AREA URNS AUTO: ABNORMAL /HPF

## 2023-06-04 PROCEDURE — 96374 THER/PROPH/DIAG INJ IV PUSH: CPT

## 2023-06-04 PROCEDURE — 96375 TX/PRO/DX INJ NEW DRUG ADDON: CPT

## 2023-06-04 PROCEDURE — 36415 COLL VENOUS BLD VENIPUNCTURE: CPT | Performed by: EMERGENCY MEDICINE

## 2023-06-04 PROCEDURE — 99284 EMERGENCY DEPT VISIT MOD MDM: CPT

## 2023-06-04 PROCEDURE — 85025 COMPLETE CBC W/AUTO DIFF WBC: CPT | Performed by: EMERGENCY MEDICINE

## 2023-06-04 PROCEDURE — 81025 URINE PREGNANCY TEST: CPT | Performed by: EMERGENCY MEDICINE

## 2023-06-04 PROCEDURE — 80053 COMPREHEN METABOLIC PANEL: CPT | Performed by: EMERGENCY MEDICINE

## 2023-06-04 PROCEDURE — 83690 ASSAY OF LIPASE: CPT | Performed by: EMERGENCY MEDICINE

## 2023-06-04 PROCEDURE — 81001 URINALYSIS AUTO W/SCOPE: CPT | Performed by: EMERGENCY MEDICINE

## 2023-06-04 PROCEDURE — 96361 HYDRATE IV INFUSION ADD-ON: CPT

## 2023-06-04 RX ORDER — DICYCLOMINE HCL 20 MG
20 TABLET ORAL ONCE
Status: COMPLETED | OUTPATIENT
Start: 2023-06-04 | End: 2023-06-04

## 2023-06-04 RX ORDER — KETOROLAC TROMETHAMINE 30 MG/ML
15 INJECTION, SOLUTION INTRAMUSCULAR; INTRAVENOUS ONCE
Status: COMPLETED | OUTPATIENT
Start: 2023-06-04 | End: 2023-06-04

## 2023-06-04 RX ORDER — ONDANSETRON 2 MG/ML
4 INJECTION INTRAMUSCULAR; INTRAVENOUS ONCE
Status: COMPLETED | OUTPATIENT
Start: 2023-06-04 | End: 2023-06-04

## 2023-06-04 RX ORDER — DICYCLOMINE HCL 20 MG
20 TABLET ORAL 2 TIMES DAILY
Qty: 20 TABLET | Refills: 0 | Status: SHIPPED | OUTPATIENT
Start: 2023-06-04

## 2023-06-04 RX ORDER — ONDANSETRON 4 MG/1
4 TABLET, ORALLY DISINTEGRATING ORAL EVERY 8 HOURS PRN
Qty: 20 TABLET | Refills: 0 | Status: SHIPPED | OUTPATIENT
Start: 2023-06-04

## 2023-06-04 RX ADMIN — SODIUM CHLORIDE 1000 ML: 0.9 INJECTION, SOLUTION INTRAVENOUS at 15:11

## 2023-06-04 RX ADMIN — DICYCLOMINE HYDROCHLORIDE 20 MG: 20 TABLET ORAL at 15:05

## 2023-06-04 RX ADMIN — KETOROLAC TROMETHAMINE 15 MG: 30 INJECTION, SOLUTION INTRAMUSCULAR; INTRAVENOUS at 16:15

## 2023-06-04 RX ADMIN — ONDANSETRON 4 MG: 2 INJECTION INTRAMUSCULAR; INTRAVENOUS at 15:14

## 2023-06-04 NOTE — Clinical Note
Francis Rosario was seen and treated in our emergency department on 6/4/2023  Diagnosis:     Eva Castillo  may return to work on return date  She may return on this date: 06/05/2023         If you have any questions or concerns, please don't hesitate to call        Sonia Parker RN    ______________________________           _______________          _______________  Hospital Representative                              Date                                Time

## 2023-06-04 NOTE — Clinical Note
Brendan Navarro was seen and treated in our emergency department on 6/4/2023  Diagnosis:     Ap Serrano    She may return on this date: 06/05/2023         If you have any questions or concerns, please don't hesitate to call        Luiza Currie MD    ______________________________           _______________          _______________  Hospital Representative                              Date                                Time

## 2023-06-04 NOTE — Clinical Note
Marina Boykin was seen and treated in our emergency department on 6/4/2023  Diagnosis:     Samira Cortes    She may return on this date: 06/05/2023         If you have any questions or concerns, please don't hesitate to call        Alena Crystal MD    ______________________________           _______________          _______________  Hospital Representative                              Date                                Time

## 2023-06-04 NOTE — Clinical Note
Gurdeep Degroot was seen and treated in our emergency department on 6/4/2023  Diagnosis:     Janet Vargasingris    She may return on this date: 06/05/2023         If you have any questions or concerns, please don't hesitate to call        Adam Garsia MD    ______________________________           _______________          _______________  Hospital Representative                              Date                                Time

## 2023-06-04 NOTE — Clinical Note
Francis Ponce was seen and treated in our emergency department on 6/4/2023  Diagnosis:     Eva Jonathan    She may return on this date: 06/05/2023         If you have any questions or concerns, please don't hesitate to call        Nory Germain MD    ______________________________           _______________          _______________  Hospital Representative                              Date                                Time

## 2023-06-04 NOTE — Clinical Note
Vinh Hogan was seen and treated in our emergency department on 6/4/2023  Diagnosis:     Tracey Mccall    She may return on this date: 06/05/2023         If you have any questions or concerns, please don't hesitate to call        Lori Vail MD    ______________________________           _______________          _______________  Hospital Representative                              Date                                Time

## 2023-06-04 NOTE — Clinical Note
Eufemia Ross was seen and treated in our emergency department on 6/4/2023  Diagnosis:     Kb Macias    She may return on this date: 06/05/2023         If you have any questions or concerns, please don't hesitate to call        Sumi Polk MD    ______________________________           _______________          _______________  Hospital Representative                              Date                                Time

## 2023-06-04 NOTE — ED PROVIDER NOTES
"History  Chief Complaint   Patient presents with   • Abdominal Pain     Patient co abdominal pain, nausea, vomiting that started at 4am  Patient states \"I think I have food poisoning  \"      HPI  44-year-old female presents with abdominal pain, nausea and vomiting started at 4:00 this morning with diarrhea  She states she ate [place] food last night and started to feel ill after this  No fevers or chills  Pain is in left upper quadrant/epigastric area and cramping  Prior to Admission Medications   Prescriptions Last Dose Informant Patient Reported? Taking?   norethindrone-ethinyl estradiol (Kerrie Zepeda FE ) 1-20 MG-MCG per tablet   No No   Sig: Take 1 tablet by mouth daily      Facility-Administered Medications: None       Past Medical History:   Diagnosis Date   • Peanut allergy    • Pregnancy     RESOLVED: [id number]   • Seasonal allergies    • Varicella     had as child before 9years old        Past Surgical History:   Procedure Laterality Date   •  SECTION     • CYST REMOVAL      left breast, benign   • DE  DELIVERY ONLY N/A 2018    Procedure:  SECTION (); Surgeon: Jony Bowden DO;  Location: [facility];  Service: Obstetrics       Family History   Problem Relation Age of Onset   • Asthma Father    • Hypertension Father    • Vision loss Father    • Thyroid disease Father    • Colon cancer Father    • Learning disabilities Son    • Autism Son    • Hypertension Maternal Grandmother    • Asthma Cousin    • Asthma Cousin    • Stroke Mother         CVA   • Migraines Mother    • Kidney disease Paternal Grandmother      I have reviewed and agree with the history as documented      E-Cigarette/Vaping   • E-Cigarette Use Never User      E-Cigarette/Vaping Substances   • Nicotine No    • THC No    • CBD No    • Flavoring No    • Other No    • Unknown No      Social History     Tobacco Use   • Smoking status: Never   • Smokeless tobacco: Never   Vaping Use   • Vaping Use: Never used " Substance Use Topics   • Alcohol use: Yes     Comment: occasional use   • Drug use: No       Review of Systems   Constitutional: Negative for chills and fever  HENT: Negative for dental problem and ear pain  Eyes: Negative for pain and redness  Respiratory: Negative for cough and shortness of breath  Cardiovascular: Negative for chest pain and palpitations  Gastrointestinal: Positive for abdominal pain, diarrhea, nausea and vomiting  Endocrine: Negative for polydipsia and polyphagia  Genitourinary: Negative for dysuria and frequency  Musculoskeletal: Negative for arthralgias and joint swelling  Skin: Negative for color change and rash  Neurological: Negative for dizziness and headaches  Psychiatric/Behavioral: Negative for behavioral problems and confusion  All other systems reviewed and are negative  Physical Exam  Physical Exam  Vitals and nursing note reviewed  Constitutional:       General: She is not in acute distress  Appearance: She is well-developed  She is not diaphoretic  HENT:      Head: Atraumatic  Right Ear: External ear normal       Left Ear: External ear normal       Nose: Nose normal    Eyes:      Conjunctiva/sclera: Conjunctivae normal       Pupils: Pupils are equal, round, and reactive to light  Neck:      Vascular: No JVD  Cardiovascular:      Rate and Rhythm: Normal rate and regular rhythm  Heart sounds: Normal heart sounds  No murmur heard  Pulmonary:      Effort: Pulmonary effort is normal  No respiratory distress  Breath sounds: Normal breath sounds  No wheezing  Abdominal:      General: Bowel sounds are normal  There is no distension  Palpations: Abdomen is soft  Tenderness: There is no abdominal tenderness  Musculoskeletal:         General: Normal range of motion  Cervical back: Normal range of motion and neck supple  Skin:     General: Skin is warm and dry        Capillary Refill: Capillary refill takes less than 2 seconds  Neurological:      Mental Status: She is alert and oriented to person, place, and time  Cranial Nerves: No cranial nerve deficit     Psychiatric:         Behavior: Behavior normal          Vital Signs  ED Triage Vitals   Temperature Pulse Respirations Blood Pressure SpO2   06/04/23 1315 06/04/23 1315 06/04/23 1315 06/04/23 1315 06/04/23 1315   98 4 °F (36 9 °C) (!) 110 18 118/62 97 %      Temp Source Heart Rate Source Patient Position - Orthostatic VS BP Location FiO2 (%)   06/04/23 1315 06/04/23 1315 06/04/23 1315 06/04/23 1315 --   Oral Monitor Sitting Left arm       Pain Score       06/04/23 1615       7           Vitals:    06/04/23 1315   BP: 118/62   Pulse: (!) 110   Patient Position - Orthostatic VS: Sitting         Visual Acuity      ED Medications  Medications   ondansetron (ZOFRAN) injection 4 mg (4 mg Intravenous Given 6/4/23 1514)   dicyclomine (BENTYL) tablet 20 mg (20 mg Oral Given 6/4/23 1505)   sodium chloride 0 9 % bolus 1,000 mL (1,000 mL Intravenous New Bag 6/4/23 1511)   ketorolac (TORADOL) injection 15 mg (15 mg Intravenous Given 6/4/23 1615)       Diagnostic Studies  Results Reviewed     Procedure Component Value Units Date/Time    CBC and differential [[id number]]  (Abnormal) Collected: 06/04/23 1512    Lab Status: Final result Specimen: Blood from Arm, Left Updated: 06/04/23 1602     WBC 15 02 Thousand/uL      RBC 4 70 Million/uL      Hemoglobin 12 8 g/dL      Hematocrit 40 5 %      MCV 86 fL      MCH 27 2 pg      MCHC 31 6 g/dL      RDW 13 5 %      MPV 9 4 fL      Platelets 300 Thousands/uL      nRBC 0 /100 WBCs      Neutrophils Relative 94 %      Immat GRANS % 0 %      Lymphocytes Relative 4 %      Monocytes Relative 2 %      Eosinophils Relative 0 %      Basophils Relative 0 %      Neutrophils Absolute 14 14 Thousands/µL      Immature Grans Absolute 0 04 Thousand/uL      Lymphocytes Absolute 0 55 Thousands/µL      Monocytes Absolute 0 28 Thousand/µL      Eosinophils Absolute 0 00 Thousand/µL      Basophils Absolute 0 01 Thousands/µL     Narrative: This is an appended report  These results have been appended to a previously verified report      Lipase [[id number]]  (Normal) Collected: 06/04/23 1512    Lab Status: Final result Specimen: Blood from Arm, Left Updated: 06/04/23 1544     Lipase 14 u/L     Comprehensive metabolic panel [[id number]] Collected: 06/04/23 1512    Lab Status: Final result Specimen: Blood from Arm, Left Updated: 06/04/23 1543     Sodium 136 mmol/L      Potassium 3 9 mmol/L      Chloride 104 mmol/L      CO2 26 mmol/L      ANION GAP 6 mmol/L      BUN 10 mg/dL      Creatinine 0 68 mg/dL      Glucose 102 mg/dL      Calcium 9 1 mg/dL      AST 17 U/L      ALT 12 U/L      Alkaline Phosphatase 51 U/L      Total Protein 8 0 g/dL      Albumin 4 6 g/dL      Total Bilirubin 0 67 mg/dL      eGFR 112 ml/min/1 73sq m     Narrative:      [place] guidelines for Chronic Kidney Disease (CKD):   •  Stage 1 with normal or high GFR (GFR > 90 mL/min/1 73 square meters)  •  Stage 2 Mild CKD (GFR = 60-89 mL/min/1 73 square meters)  •  Stage 3A Moderate CKD (GFR = 45-59 mL/min/1 73 square meters)  •  Stage 3B Moderate CKD (GFR = 30-44 mL/min/1 73 square meters)  •  Stage 4 Severe CKD (GFR = 15-29 mL/min/1 73 square meters)  •  Stage 5 End Stage CKD (GFR <15 mL/min/1 73 square meters)  Note: GFR calculation is accurate only with a steady state creatinine    Urine Microscopic [[id number]]  (Abnormal) Collected: 06/04/23 1503    Lab Status: Final result Specimen: Urine, Clean Catch Updated: 06/04/23 1531     RBC, UA 2-4 /hpf      WBC, UA None Seen /hpf      Epithelial Cells Occasional /hpf      Bacteria, UA None Seen /hpf      MUCUS THREADS Occasional    UA w Reflex to Microscopic w Reflex to Culture [[id number]]  (Abnormal) Collected: 06/04/23 1503    Lab Status: Final result Specimen: Urine, Clean Catch Updated: 06/04/23 1530     Color, UA Light Yellow Clarity, UA Clear     Specific Gravity, UA 1 033     pH, UA 6 0     Leukocytes, UA Negative     Nitrite, UA Negative     Protein, UA Trace mg/dl      Glucose, UA Negative mg/dl      Ketones, UA Trace mg/dl      Urobilinogen, UA <2 0 mg/dl      Bilirubin, UA Negative     Occult Blood, UA Trace    POCT pregnancy, urine [[id number]]  (Normal) Resulted: 06/04/23 1519    Lab Status: Final result Updated: 06/04/23 1519     EXT Preg Test, Ur Negative     Control Valid                 No orders to display              Procedures  Procedures         ED Course                                             MDM  No abdominal tenderness, doubt surgical etiology at this time  Labs are unremarkable and reassuring, leukocytosis 2/2 vomiting likely  Discussed return precautions  Disposition  Final diagnoses:   Abdominal pain   Nausea     Time reflects when diagnosis was documented in both MDM as applicable and the Disposition within this note     Time User Action Codes Description Comment    6/4/2023  3:49 PM Capri Motto Add [R10 9] Abdominal pain     6/4/2023  3:49 PM Capri Motto Add [R11 0] Nausea       ED Disposition     ED Disposition   Discharge    Condition   Stable    Date/Time   Sun Jun 4, 2023  3:48 PM    Comment   Emmie Seip discharge to home/self care                 Follow-up Information     Follow up With Specialties Details Why Contact Info Additional Information    [address] [facility] Emergency Department Emergency Medicine  As needed [address] [facility] [id number] [facility] Emergency Department, [address] [place], [place], [address] [facility] Internal Medicine Call  for primary care as needed [address]  [address] [place] [address]  [phone]             Patient's Medications   Discharge Prescriptions    DICYCLOMINE (BENTYL) 20 MG TABLET    Take 1 tablet (20 mg total) by mouth 2 (two) times a day Start Date: 6/4/2023  End Date: --       Order Dose: 20 mg       Quantity: 20 tablet    Refills: 0    ONDANSETRON (ZOFRAN-ODT) 4 MG DISINTEGRATING TABLET    Take 1 tablet (4 mg total) by mouth every 8 (eight) hours as needed for nausea       Start Date: 6/4/2023  End Date: --       Order Dose: 4 mg       Quantity: 20 tablet    Refills: 0       No discharge procedures on file      PDMP Review     None          ED Provider  Electronically Signed by           Shaun Victor MD  06/04/23 9863

## 2024-02-21 PROBLEM — Z01.419 ENCOUNTER FOR GYNECOLOGICAL EXAMINATION WITHOUT ABNORMAL FINDING: Status: RESOLVED | Noted: 2021-07-22 | Resolved: 2024-02-21

## 2024-02-21 PROBLEM — Z01.419 ENCOUNTER FOR ANNUAL ROUTINE GYNECOLOGICAL EXAMINATION: Status: RESOLVED | Noted: 2021-07-22 | Resolved: 2024-02-21

## 2025-07-28 ENCOUNTER — HOSPITAL ENCOUNTER (EMERGENCY)
Facility: HOSPITAL | Age: 40
Discharge: HOME | End: 2025-07-28
Payer: COMMERCIAL

## 2025-07-28 ENCOUNTER — APPOINTMENT (EMERGENCY)
Dept: CT IMAGING | Facility: HOSPITAL | Age: 40
End: 2025-07-28
Payer: COMMERCIAL

## (undated) DEVICE — SUT VICRYL 0 CTX 36 IN J978H

## (undated) DEVICE — HYDROPHILIC WOUND DRESSING WITH ZINC PLUS VITAMINS A AND B6.: Brand: DERMAGRAN®-B

## (undated) DEVICE — SKIN MARKER DUAL TIP WITH RULER CAP, FLEXIBLE RULER AND LABELS: Brand: DEVON

## (undated) DEVICE — TELFA NON-ADHERENT ABSORBENT DRESSING: Brand: TELFA

## (undated) DEVICE — GLOVE SRG BIOGEL ECLIPSE 7.5

## (undated) DEVICE — PACK C-SECTION PBDS

## (undated) DEVICE — GAUZE SPONGES,16 PLY: Brand: CURITY

## (undated) DEVICE — ABDOMINAL PAD: Brand: DERMACEA

## (undated) DEVICE — Device

## (undated) DEVICE — PROXIMATE PLUS MD MULTI-DIRECTIONAL RELEASE SKIN STAPLERS CONTAINS 35 STAINLESS STEEL STAPLES APPROXIMATE CLOSED DIMENSIONS: 6.9MM X 3.9MM WIDE: Brand: PROXIMATE

## (undated) DEVICE — GLOVE INDICATOR PI UNDERGLOVE SZ 8 BLUE